# Patient Record
Sex: FEMALE | Race: WHITE | NOT HISPANIC OR LATINO | URBAN - METROPOLITAN AREA
[De-identification: names, ages, dates, MRNs, and addresses within clinical notes are randomized per-mention and may not be internally consistent; named-entity substitution may affect disease eponyms.]

---

## 2018-04-09 ENCOUNTER — EMERGENCY (EMERGENCY)
Facility: HOSPITAL | Age: 63
LOS: 0 days | Discharge: HOME | End: 2018-04-10
Attending: EMERGENCY MEDICINE

## 2018-04-09 VITALS
TEMPERATURE: 98 F | HEART RATE: 70 BPM | DIASTOLIC BLOOD PRESSURE: 91 MMHG | SYSTOLIC BLOOD PRESSURE: 152 MMHG | WEIGHT: 149.91 LBS | HEIGHT: 66 IN | OXYGEN SATURATION: 100 % | RESPIRATION RATE: 18 BRPM

## 2018-04-09 DIAGNOSIS — R10.32 LEFT LOWER QUADRANT PAIN: ICD-10-CM

## 2018-04-09 DIAGNOSIS — R11.2 NAUSEA WITH VOMITING, UNSPECIFIED: ICD-10-CM

## 2018-04-09 DIAGNOSIS — R10.11 RIGHT UPPER QUADRANT PAIN: ICD-10-CM

## 2018-04-09 DIAGNOSIS — Z88.5 ALLERGY STATUS TO NARCOTIC AGENT: ICD-10-CM

## 2018-04-09 DIAGNOSIS — R50.9 FEVER, UNSPECIFIED: ICD-10-CM

## 2018-04-09 DIAGNOSIS — R51 HEADACHE: ICD-10-CM

## 2018-04-09 DIAGNOSIS — R53.1 WEAKNESS: ICD-10-CM

## 2018-04-09 DIAGNOSIS — R19.7 DIARRHEA, UNSPECIFIED: ICD-10-CM

## 2018-04-09 LAB
BASE EXCESS BLDV CALC-SCNC: 3.7 MMOL/L — HIGH (ref -2–2)
CA-I SERPL-SCNC: 1.25 MMOL/L — SIGNIFICANT CHANGE UP (ref 1.12–1.3)
GAS PNL BLDV: 142 MMOL/L — SIGNIFICANT CHANGE UP (ref 136–145)
GAS PNL BLDV: SIGNIFICANT CHANGE UP
HCO3 BLDV-SCNC: 29 MMOL/L — SIGNIFICANT CHANGE UP (ref 22–29)
HCT VFR BLDA CALC: 49 % — HIGH (ref 34–44)
HGB BLD CALC-MCNC: 16 G/DL — SIGNIFICANT CHANGE UP (ref 14–18)
HOROWITZ INDEX BLDV+IHG-RTO: 21 — SIGNIFICANT CHANGE UP
LACTATE BLDV-MCNC: 2 MMOL/L — HIGH (ref 0.5–1.6)
PCO2 BLDV: 43 MMHG — SIGNIFICANT CHANGE UP (ref 41–51)
PH BLDV: 7.43 — SIGNIFICANT CHANGE UP (ref 7.26–7.43)
PO2 BLDV: 20 MMHG — SIGNIFICANT CHANGE UP (ref 20–40)
POTASSIUM BLDV-SCNC: 3.7 MMOL/L — SIGNIFICANT CHANGE UP (ref 3.3–5.6)
SAO2 % BLDV: 37 % — SIGNIFICANT CHANGE UP

## 2018-04-09 RX ORDER — SODIUM CHLORIDE 9 MG/ML
1000 INJECTION INTRAMUSCULAR; INTRAVENOUS; SUBCUTANEOUS ONCE
Qty: 0 | Refills: 0 | Status: COMPLETED | OUTPATIENT
Start: 2018-04-09 | End: 2018-04-09

## 2018-04-09 RX ORDER — DIPHENHYDRAMINE HCL 50 MG
50 CAPSULE ORAL ONCE
Qty: 0 | Refills: 0 | Status: COMPLETED | OUTPATIENT
Start: 2018-04-09 | End: 2018-04-09

## 2018-04-09 RX ORDER — FAMOTIDINE 10 MG/ML
20 INJECTION INTRAVENOUS ONCE
Qty: 0 | Refills: 0 | Status: COMPLETED | OUTPATIENT
Start: 2018-04-09 | End: 2018-04-09

## 2018-04-09 RX ORDER — METOCLOPRAMIDE HCL 10 MG
10 TABLET ORAL ONCE
Qty: 0 | Refills: 0 | Status: COMPLETED | OUTPATIENT
Start: 2018-04-09 | End: 2018-04-10

## 2018-04-09 RX ORDER — SODIUM CHLORIDE 9 MG/ML
3 INJECTION INTRAMUSCULAR; INTRAVENOUS; SUBCUTANEOUS ONCE
Qty: 0 | Refills: 0 | Status: COMPLETED | OUTPATIENT
Start: 2018-04-09 | End: 2018-04-09

## 2018-04-09 RX ORDER — SODIUM CHLORIDE 9 MG/ML
1000 INJECTION INTRAMUSCULAR; INTRAVENOUS; SUBCUTANEOUS
Qty: 0 | Refills: 0 | Status: DISCONTINUED | OUTPATIENT
Start: 2018-04-09 | End: 2018-04-10

## 2018-04-09 NOTE — ED ADULT TRIAGE NOTE - CHIEF COMPLAINT QUOTE
Patient complaining of nausea, dizziness and multiple episodes of vomiting and diarrhea. Patient also complaining of headache.

## 2018-04-10 VITALS
DIASTOLIC BLOOD PRESSURE: 90 MMHG | OXYGEN SATURATION: 95 % | TEMPERATURE: 98 F | HEART RATE: 68 BPM | SYSTOLIC BLOOD PRESSURE: 130 MMHG | RESPIRATION RATE: 18 BRPM

## 2018-04-10 LAB
ALBUMIN SERPL ELPH-MCNC: 4 G/DL — SIGNIFICANT CHANGE UP (ref 3.5–5.2)
ALP SERPL-CCNC: 80 U/L — SIGNIFICANT CHANGE UP (ref 30–115)
ALT FLD-CCNC: 10 U/L — SIGNIFICANT CHANGE UP (ref 0–41)
ANION GAP SERPL CALC-SCNC: 14 MMOL/L — SIGNIFICANT CHANGE UP (ref 7–14)
AST SERPL-CCNC: 14 U/L — SIGNIFICANT CHANGE UP (ref 0–41)
BASOPHILS # BLD AUTO: 0.05 K/UL — SIGNIFICANT CHANGE UP (ref 0–0.2)
BASOPHILS NFR BLD AUTO: 0.3 % — SIGNIFICANT CHANGE UP (ref 0–1)
BILIRUB DIRECT SERPL-MCNC: <0.2 MG/DL — SIGNIFICANT CHANGE UP (ref 0–0.2)
BILIRUB INDIRECT FLD-MCNC: >0.1 MG/DL — LOW (ref 0.2–1.2)
BILIRUB SERPL-MCNC: 0.3 MG/DL — SIGNIFICANT CHANGE UP (ref 0.2–1.2)
BUN SERPL-MCNC: 10 MG/DL — SIGNIFICANT CHANGE UP (ref 10–20)
CALCIUM SERPL-MCNC: 10.3 MG/DL — HIGH (ref 8.5–10.1)
CHLORIDE SERPL-SCNC: 100 MMOL/L — SIGNIFICANT CHANGE UP (ref 98–110)
CO2 SERPL-SCNC: 27 MMOL/L — SIGNIFICANT CHANGE UP (ref 17–32)
CREAT SERPL-MCNC: 0.8 MG/DL — SIGNIFICANT CHANGE UP (ref 0.7–1.5)
EOSINOPHIL # BLD AUTO: 0.03 K/UL — SIGNIFICANT CHANGE UP (ref 0–0.7)
EOSINOPHIL NFR BLD AUTO: 0.2 % — SIGNIFICANT CHANGE UP (ref 0–8)
GLUCOSE SERPL-MCNC: 146 MG/DL — HIGH (ref 70–99)
HCT VFR BLD CALC: 45.1 % — SIGNIFICANT CHANGE UP (ref 37–47)
HGB BLD-MCNC: 15.1 G/DL — SIGNIFICANT CHANGE UP (ref 12–16)
IMM GRANULOCYTES NFR BLD AUTO: 0.5 % — HIGH (ref 0.1–0.3)
LACTATE SERPL-SCNC: 2.1 MMOL/L — SIGNIFICANT CHANGE UP (ref 0.5–2.2)
LIDOCAIN IGE QN: 42 U/L — SIGNIFICANT CHANGE UP (ref 7–60)
LYMPHOCYTES # BLD AUTO: 1.62 K/UL — SIGNIFICANT CHANGE UP (ref 1.2–3.4)
LYMPHOCYTES # BLD AUTO: 11.1 % — LOW (ref 20.5–51.1)
MCHC RBC-ENTMCNC: 30.3 PG — SIGNIFICANT CHANGE UP (ref 27–31)
MCHC RBC-ENTMCNC: 33.5 G/DL — SIGNIFICANT CHANGE UP (ref 32–37)
MCV RBC AUTO: 90.6 FL — SIGNIFICANT CHANGE UP (ref 81–99)
MONOCYTES # BLD AUTO: 0.48 K/UL — SIGNIFICANT CHANGE UP (ref 0.1–0.6)
MONOCYTES NFR BLD AUTO: 3.3 % — SIGNIFICANT CHANGE UP (ref 1.7–9.3)
NEUTROPHILS # BLD AUTO: 12.33 K/UL — HIGH (ref 1.4–6.5)
NEUTROPHILS NFR BLD AUTO: 84.6 % — HIGH (ref 42.2–75.2)
NRBC # BLD: 0 /100 WBCS — SIGNIFICANT CHANGE UP (ref 0–0)
PLATELET # BLD AUTO: 378 K/UL — SIGNIFICANT CHANGE UP (ref 130–400)
POTASSIUM SERPL-MCNC: 4.2 MMOL/L — SIGNIFICANT CHANGE UP (ref 3.5–5)
POTASSIUM SERPL-SCNC: 4.2 MMOL/L — SIGNIFICANT CHANGE UP (ref 3.5–5)
PROT SERPL-MCNC: 6.5 G/DL — SIGNIFICANT CHANGE UP (ref 6–8)
RBC # BLD: 4.98 M/UL — SIGNIFICANT CHANGE UP (ref 4.2–5.4)
RBC # FLD: 12.9 % — SIGNIFICANT CHANGE UP (ref 11.5–14.5)
SODIUM SERPL-SCNC: 141 MMOL/L — SIGNIFICANT CHANGE UP (ref 135–146)
WBC # BLD: 14.59 K/UL — HIGH (ref 4.8–10.8)
WBC # FLD AUTO: 14.59 K/UL — HIGH (ref 4.8–10.8)

## 2018-04-10 RX ADMIN — Medication 10 MILLIGRAM(S): at 00:26

## 2018-04-10 RX ADMIN — SODIUM CHLORIDE 3 MILLILITER(S): 9 INJECTION INTRAMUSCULAR; INTRAVENOUS; SUBCUTANEOUS at 00:22

## 2018-04-10 RX ADMIN — FAMOTIDINE 20 MILLIGRAM(S): 10 INJECTION INTRAVENOUS at 00:35

## 2018-04-10 RX ADMIN — SODIUM CHLORIDE 1000 MILLILITER(S): 9 INJECTION INTRAMUSCULAR; INTRAVENOUS; SUBCUTANEOUS at 00:28

## 2018-04-10 RX ADMIN — Medication 102 MILLIGRAM(S): at 00:27

## 2018-04-10 RX ADMIN — SODIUM CHLORIDE 125 MILLILITER(S): 9 INJECTION INTRAMUSCULAR; INTRAVENOUS; SUBCUTANEOUS at 02:07

## 2018-04-10 NOTE — ED PROVIDER NOTE - ATTENDING CONTRIBUTION TO CARE
62yF p/w ruq LLq abdominal pain a/w diarrhea. nonbloody  no fever nosyncope  no chest pain or back pain. Pt with hx of migraines seen by neurolgoy but didn't want to take medications recommended, with resurgenceof her typical migraines headaceh pain  described sd throbbing right sided -  o new features PE alert well appearing non toxic cvs rrr resp cta  neck suppl perrl eomi abd soft mild rlw tendneress no rebound guarding regidity nondistended - skin normal - plan labs urine CT reasses

## 2018-04-10 NOTE — ED PROVIDER NOTE - PROGRESS NOTE DETAILS
D/w patient all results and copies given. Pt feeling much better. headache resolved and nausea resolved. Neuro intact. Abd + BS Soft NT/ND

## 2018-04-10 NOTE — ED PROVIDER NOTE - CARE PLAN
Principal Discharge DX:	Nausea vomiting and diarrhea  Secondary Diagnosis:	Abdominal pain  Secondary Diagnosis:	Headache

## 2018-04-10 NOTE — ED PROVIDER NOTE - OBJECTIVE STATEMENT
62 year old female complaining of abd pina with diarrhea fever and weakness. patient states that she also suffers from migraines and at home meds arent working. patient states typical migraine and has had prior neuro consult.

## 2018-10-18 ENCOUNTER — EMERGENCY (EMERGENCY)
Facility: HOSPITAL | Age: 63
LOS: 0 days | Discharge: HOME | End: 2018-10-18
Attending: EMERGENCY MEDICINE | Admitting: EMERGENCY MEDICINE

## 2018-10-18 VITALS
WEIGHT: 149.91 LBS | HEART RATE: 77 BPM | TEMPERATURE: 99 F | DIASTOLIC BLOOD PRESSURE: 102 MMHG | RESPIRATION RATE: 19 BRPM | OXYGEN SATURATION: 97 % | HEIGHT: 66 IN | SYSTOLIC BLOOD PRESSURE: 185 MMHG

## 2018-10-18 VITALS
RESPIRATION RATE: 20 BRPM | HEART RATE: 80 BPM | TEMPERATURE: 98 F | SYSTOLIC BLOOD PRESSURE: 151 MMHG | DIASTOLIC BLOOD PRESSURE: 84 MMHG | OXYGEN SATURATION: 98 %

## 2018-10-18 DIAGNOSIS — R11.0 NAUSEA: ICD-10-CM

## 2018-10-18 DIAGNOSIS — R10.9 UNSPECIFIED ABDOMINAL PAIN: ICD-10-CM

## 2018-10-18 DIAGNOSIS — R10.11 RIGHT UPPER QUADRANT PAIN: ICD-10-CM

## 2018-10-18 DIAGNOSIS — Z88.5 ALLERGY STATUS TO NARCOTIC AGENT: ICD-10-CM

## 2018-10-18 DIAGNOSIS — R10.13 EPIGASTRIC PAIN: ICD-10-CM

## 2018-10-18 DIAGNOSIS — Z87.19 PERSONAL HISTORY OF OTHER DISEASES OF THE DIGESTIVE SYSTEM: ICD-10-CM

## 2018-10-18 DIAGNOSIS — R19.5 OTHER FECAL ABNORMALITIES: ICD-10-CM

## 2018-10-18 DIAGNOSIS — I10 ESSENTIAL (PRIMARY) HYPERTENSION: ICD-10-CM

## 2018-10-18 DIAGNOSIS — Z98.890 OTHER SPECIFIED POSTPROCEDURAL STATES: ICD-10-CM

## 2018-10-18 LAB
ALBUMIN SERPL ELPH-MCNC: 4.6 G/DL — SIGNIFICANT CHANGE UP (ref 3.5–5.2)
ALP SERPL-CCNC: 84 U/L — SIGNIFICANT CHANGE UP (ref 30–115)
ALT FLD-CCNC: 12 U/L — SIGNIFICANT CHANGE UP (ref 0–41)
ANION GAP SERPL CALC-SCNC: 14 MMOL/L — SIGNIFICANT CHANGE UP (ref 7–14)
APPEARANCE UR: CLEAR — SIGNIFICANT CHANGE UP
AST SERPL-CCNC: 18 U/L — SIGNIFICANT CHANGE UP (ref 0–41)
BASOPHILS # BLD AUTO: 0.06 K/UL — SIGNIFICANT CHANGE UP (ref 0–0.2)
BASOPHILS NFR BLD AUTO: 0.7 % — SIGNIFICANT CHANGE UP (ref 0–1)
BILIRUB SERPL-MCNC: 0.2 MG/DL — SIGNIFICANT CHANGE UP (ref 0.2–1.2)
BILIRUB UR-MCNC: NEGATIVE — SIGNIFICANT CHANGE UP
BUN SERPL-MCNC: 10 MG/DL — SIGNIFICANT CHANGE UP (ref 10–20)
CALCIUM SERPL-MCNC: 10 MG/DL — SIGNIFICANT CHANGE UP (ref 8.5–10.1)
CHLORIDE SERPL-SCNC: 101 MMOL/L — SIGNIFICANT CHANGE UP (ref 98–110)
CO2 SERPL-SCNC: 25 MMOL/L — SIGNIFICANT CHANGE UP (ref 17–32)
COLOR SPEC: YELLOW — SIGNIFICANT CHANGE UP
CREAT SERPL-MCNC: 0.9 MG/DL — SIGNIFICANT CHANGE UP (ref 0.7–1.5)
DIFF PNL FLD: NEGATIVE — SIGNIFICANT CHANGE UP
EOSINOPHIL # BLD AUTO: 0.42 K/UL — SIGNIFICANT CHANGE UP (ref 0–0.7)
EOSINOPHIL NFR BLD AUTO: 5.2 % — SIGNIFICANT CHANGE UP (ref 0–8)
GLUCOSE SERPL-MCNC: 92 MG/DL — SIGNIFICANT CHANGE UP (ref 70–99)
GLUCOSE UR QL: NEGATIVE MG/DL — SIGNIFICANT CHANGE UP
HCT VFR BLD CALC: 46.1 % — SIGNIFICANT CHANGE UP (ref 37–47)
HGB BLD-MCNC: 15.4 G/DL — SIGNIFICANT CHANGE UP (ref 12–16)
IMM GRANULOCYTES NFR BLD AUTO: 0.2 % — SIGNIFICANT CHANGE UP (ref 0.1–0.3)
KETONES UR-MCNC: NEGATIVE — SIGNIFICANT CHANGE UP
LACTATE SERPL-SCNC: 0.8 MMOL/L — SIGNIFICANT CHANGE UP (ref 0.5–2.2)
LEUKOCYTE ESTERASE UR-ACNC: NEGATIVE — SIGNIFICANT CHANGE UP
LIDOCAIN IGE QN: 36 U/L — SIGNIFICANT CHANGE UP (ref 7–60)
LYMPHOCYTES # BLD AUTO: 2.31 K/UL — SIGNIFICANT CHANGE UP (ref 1.2–3.4)
LYMPHOCYTES # BLD AUTO: 28.6 % — SIGNIFICANT CHANGE UP (ref 20.5–51.1)
MCHC RBC-ENTMCNC: 30.7 PG — SIGNIFICANT CHANGE UP (ref 27–31)
MCHC RBC-ENTMCNC: 33.4 G/DL — SIGNIFICANT CHANGE UP (ref 32–37)
MCV RBC AUTO: 91.8 FL — SIGNIFICANT CHANGE UP (ref 81–99)
MONOCYTES # BLD AUTO: 0.53 K/UL — SIGNIFICANT CHANGE UP (ref 0.1–0.6)
MONOCYTES NFR BLD AUTO: 6.6 % — SIGNIFICANT CHANGE UP (ref 1.7–9.3)
NEUTROPHILS # BLD AUTO: 4.75 K/UL — SIGNIFICANT CHANGE UP (ref 1.4–6.5)
NEUTROPHILS NFR BLD AUTO: 58.7 % — SIGNIFICANT CHANGE UP (ref 42.2–75.2)
NITRITE UR-MCNC: NEGATIVE — SIGNIFICANT CHANGE UP
NRBC # BLD: 0 /100 WBCS — SIGNIFICANT CHANGE UP (ref 0–0)
PH UR: 5.5 — SIGNIFICANT CHANGE UP (ref 5–8)
PLATELET # BLD AUTO: 332 K/UL — SIGNIFICANT CHANGE UP (ref 130–400)
POTASSIUM SERPL-MCNC: 4.4 MMOL/L — SIGNIFICANT CHANGE UP (ref 3.5–5)
POTASSIUM SERPL-SCNC: 4.4 MMOL/L — SIGNIFICANT CHANGE UP (ref 3.5–5)
PROT SERPL-MCNC: 7.5 G/DL — SIGNIFICANT CHANGE UP (ref 6–8)
PROT UR-MCNC: NEGATIVE MG/DL — SIGNIFICANT CHANGE UP
RBC # BLD: 5.02 M/UL — SIGNIFICANT CHANGE UP (ref 4.2–5.4)
RBC # FLD: 12.4 % — SIGNIFICANT CHANGE UP (ref 11.5–14.5)
SODIUM SERPL-SCNC: 140 MMOL/L — SIGNIFICANT CHANGE UP (ref 135–146)
SP GR SPEC: >=1.03 (ref 1.01–1.03)
TROPONIN T SERPL-MCNC: <0.01 NG/ML — SIGNIFICANT CHANGE UP
UROBILINOGEN FLD QL: 0.2 MG/DL — SIGNIFICANT CHANGE UP (ref 0.2–0.2)
WBC # BLD: 8.09 K/UL — SIGNIFICANT CHANGE UP (ref 4.8–10.8)
WBC # FLD AUTO: 8.09 K/UL — SIGNIFICANT CHANGE UP (ref 4.8–10.8)

## 2018-10-18 RX ORDER — SODIUM CHLORIDE 9 MG/ML
1000 INJECTION INTRAMUSCULAR; INTRAVENOUS; SUBCUTANEOUS ONCE
Qty: 0 | Refills: 0 | Status: COMPLETED | OUTPATIENT
Start: 2018-10-18 | End: 2018-10-18

## 2018-10-18 RX ORDER — FAMOTIDINE 10 MG/ML
20 INJECTION INTRAVENOUS ONCE
Qty: 0 | Refills: 0 | Status: COMPLETED | OUTPATIENT
Start: 2018-10-18 | End: 2018-10-18

## 2018-10-18 RX ORDER — ONDANSETRON 8 MG/1
4 TABLET, FILM COATED ORAL ONCE
Qty: 0 | Refills: 0 | Status: COMPLETED | OUTPATIENT
Start: 2018-10-18 | End: 2018-10-18

## 2018-10-18 RX ADMIN — SODIUM CHLORIDE 2000 MILLILITER(S): 9 INJECTION INTRAMUSCULAR; INTRAVENOUS; SUBCUTANEOUS at 18:30

## 2018-10-18 RX ADMIN — ONDANSETRON 4 MILLIGRAM(S): 8 TABLET, FILM COATED ORAL at 18:59

## 2018-10-18 RX ADMIN — FAMOTIDINE 100 MILLIGRAM(S): 10 INJECTION INTRAVENOUS at 19:01

## 2018-10-18 NOTE — ED ADULT NURSE NOTE - CHIEF COMPLAINT QUOTE
patient complaining of nausea, diarrhea, "stomach swelling and bloated" and abdominal pain RUQ "not bad at all, just there" described as aching, 2/10 patient denies chest pain, pt states, "No chest pain, it's all stomach"

## 2018-10-18 NOTE — ED ADULT TRIAGE NOTE - CHIEF COMPLAINT QUOTE
patient complaining of nausea, diarrhea, "stomach swelling and bloated" and abdominal pain RUQ "not bad at all, just there" described as aching, 2/10 patient complaining of nausea, diarrhea, "stomach swelling and bloated" and abdominal pain RUQ "not bad at all, just there" described as aching, 2/10, pt denies chest pain, pt states, "No chest pain, it's all stomach"

## 2018-10-18 NOTE — ED PROVIDER NOTE - OBJECTIVE STATEMENT
64 yo female with h/o diverticulitis and HTN presents to the ED c/o nausea since this morning. Associated with RUQ abdominal pain. Patient states she gets intermittent RUQ pain x several months. Abdominal pain described as burning, nonradiating. Associated with loose stools. Denies fever, chills, chest pain, sob, vomiting, urinary sxs, flank pain. no h/o abdominal surgery.  Last colonoscopy was 3 years ago.

## 2018-10-18 NOTE — ED PROVIDER NOTE - PHYSICAL EXAMINATION
GENERAL:  well appearing, non-toxic female in no acute distress  SKIN: skin warm, pink and dry. MMM. no rash or skin changes.   PULM: CTAB. Normal respiratory effort. No respiratory distress. No wheezes, stridor, rales or rhonchi. No retractions  CV: RRR, no M/R/G.   ABD: Soft. + mild RUQ abdominal tenderness. neg german sign. no rebound or guarding. no CVA tenderness.   MSK: FROM of all extremities.  No MSK tenderness. No edema, erythema, cyanosis. Distal pulses intact.  NEURO: A+Ox3, no sensory/motor deficits, CN II-XII intact. No speech slurring, pronator drift, facial asymmetry. Normal finger-to-nose  b/l. 5/5 strength throughout. Normal gait. Negative Romberg.

## 2018-10-18 NOTE — ED ADULT NURSE NOTE - NSIMPLEMENTINTERV_GEN_ALL_ED
Implemented All Universal Safety Interventions:  [place] to call system. Call bell, personal items and telephone within reach. Instruct patient to call for assistance. Room bathroom lighting operational. Non-slip footwear when patient is off stretcher. Physically safe environment: no spills, clutter or unnecessary equipment. Stretcher in lowest position, wheels locked, appropriate side rails in place.

## 2018-10-18 NOTE — ED PROVIDER NOTE - NS ED ROS FT
Constitutional: no fever, chills   Cardiovascular: no chest pain, no sob  Respiratory: no cough, no shortness of breath  Gastrointestinal: +nausea. + abdominal pain.  no vomiting. + loose stool. no melena or BRBPR. no prior abdominal surgeries.   : no pelvic pain, no vaginal bleeding or discharge. no urinary sxs, no flank pain.  Integumentary: no rash or skin changes. no edema  Neurological: no headache, no dizziness, no visual changes, no UE/LE weakness or paresthesias.

## 2018-10-18 NOTE — ED PROVIDER NOTE - ATTENDING CONTRIBUTION TO CARE
63 year old female, pmhx of chronic RUQ pain, comes in with nausea and epigastric discomfort, patient states it began yesterday and worsened today, no cp/sob, no vomiting, patient states that she has nausea and a few episodes of loose stool.     CONSTITUTIONAL: Well-developed; well-nourished; in no acute distress. Sitting up and providing appropriate history and physical examination  SKIN: skin exam is warm and dry, no acute rash.  HEAD: Normocephalic; atraumatic.  EYES: PERRL, 3 mm bilateral, no nystagmus, EOM intact; conjunctiva and sclera clear.  ENT: No nasal discharge; airway clear.  NECK: Supple; non tender. + full passive ROM in all directions. No JVD  CARD: S1, S2 normal; no murmurs, gallops, or rubs. Regular rate and rhythm. + Symmetric Strong Pulses  RESP: No wheezes, rales or rhonchi. Good air movement bilaterally  ABD: soft; non-distended; non-tender. No Rebound, No Guarding, No signs of peritonitis, No CVA tenderness. No pulsatile abdominal mass. + Strong and Symmetric Pulses  EXT: Normal ROM. No clubbing, cyanosis or edema. Dp and Pt Pulses intact. Cap refill less than 3 seconds  NEURO: CN 2-12 intact, normal finger to nose, normal romberg, stable gait, no sensory or motor deficits, Alert, oriented, grossly unremarkable. No Focal deficits. GCS 15. NIH 0  PSYCH: Cooperative, appropriate.     Patient states symptoms improved after hydration, repeat abdominal exam prior to discharge unremarkable. Patient given detailed return precautions.

## 2018-10-19 PROBLEM — G43.909 MIGRAINE, UNSPECIFIED, NOT INTRACTABLE, WITHOUT STATUS MIGRAINOSUS: Chronic | Status: ACTIVE | Noted: 2018-04-11

## 2019-10-01 ENCOUNTER — OUTPATIENT (OUTPATIENT)
Dept: OUTPATIENT SERVICES | Facility: HOSPITAL | Age: 64
LOS: 1 days | End: 2019-10-01
Payer: MEDICAID

## 2019-10-01 PROCEDURE — G9001: CPT

## 2019-10-29 ENCOUNTER — EMERGENCY (EMERGENCY)
Facility: HOSPITAL | Age: 64
LOS: 0 days | Discharge: HOME | End: 2019-10-29
Attending: EMERGENCY MEDICINE | Admitting: EMERGENCY MEDICINE
Payer: MEDICAID

## 2019-10-29 VITALS
OXYGEN SATURATION: 98 % | SYSTOLIC BLOOD PRESSURE: 157 MMHG | TEMPERATURE: 98 F | HEART RATE: 68 BPM | RESPIRATION RATE: 18 BRPM | DIASTOLIC BLOOD PRESSURE: 104 MMHG

## 2019-10-29 VITALS — WEIGHT: 149.91 LBS

## 2019-10-29 DIAGNOSIS — Z88.5 ALLERGY STATUS TO NARCOTIC AGENT: ICD-10-CM

## 2019-10-29 DIAGNOSIS — R10.9 UNSPECIFIED ABDOMINAL PAIN: ICD-10-CM

## 2019-10-29 DIAGNOSIS — R10.30 LOWER ABDOMINAL PAIN, UNSPECIFIED: ICD-10-CM

## 2019-10-29 DIAGNOSIS — R19.7 DIARRHEA, UNSPECIFIED: ICD-10-CM

## 2019-10-29 DIAGNOSIS — R11.0 NAUSEA: ICD-10-CM

## 2019-10-29 LAB
ALBUMIN SERPL ELPH-MCNC: 3.9 G/DL — SIGNIFICANT CHANGE UP (ref 3.5–5.2)
ALP SERPL-CCNC: 78 U/L — SIGNIFICANT CHANGE UP (ref 30–115)
ALT FLD-CCNC: 12 U/L — SIGNIFICANT CHANGE UP (ref 0–41)
ANION GAP SERPL CALC-SCNC: 10 MMOL/L — SIGNIFICANT CHANGE UP (ref 7–14)
AST SERPL-CCNC: 12 U/L — SIGNIFICANT CHANGE UP (ref 0–41)
BASOPHILS # BLD AUTO: 0.04 K/UL — SIGNIFICANT CHANGE UP (ref 0–0.2)
BASOPHILS NFR BLD AUTO: 0.6 % — SIGNIFICANT CHANGE UP (ref 0–1)
BILIRUB DIRECT SERPL-MCNC: <0.2 MG/DL — SIGNIFICANT CHANGE UP (ref 0–0.2)
BILIRUB INDIRECT FLD-MCNC: >0.1 MG/DL — LOW (ref 0.2–1.2)
BILIRUB SERPL-MCNC: 0.3 MG/DL — SIGNIFICANT CHANGE UP (ref 0.2–1.2)
BUN SERPL-MCNC: 11 MG/DL — SIGNIFICANT CHANGE UP (ref 10–20)
CALCIUM SERPL-MCNC: 9.4 MG/DL — SIGNIFICANT CHANGE UP (ref 8.5–10.1)
CHLORIDE SERPL-SCNC: 104 MMOL/L — SIGNIFICANT CHANGE UP (ref 98–110)
CO2 SERPL-SCNC: 26 MMOL/L — SIGNIFICANT CHANGE UP (ref 17–32)
CREAT SERPL-MCNC: 0.8 MG/DL — SIGNIFICANT CHANGE UP (ref 0.7–1.5)
EOSINOPHIL # BLD AUTO: 0.08 K/UL — SIGNIFICANT CHANGE UP (ref 0–0.7)
EOSINOPHIL NFR BLD AUTO: 1.3 % — SIGNIFICANT CHANGE UP (ref 0–8)
GLUCOSE SERPL-MCNC: 96 MG/DL — SIGNIFICANT CHANGE UP (ref 70–99)
HCT VFR BLD CALC: 47.5 % — HIGH (ref 37–47)
HGB BLD-MCNC: 15.6 G/DL — SIGNIFICANT CHANGE UP (ref 12–16)
IMM GRANULOCYTES NFR BLD AUTO: 0.3 % — SIGNIFICANT CHANGE UP (ref 0.1–0.3)
LACTATE SERPL-SCNC: 0.9 MMOL/L — SIGNIFICANT CHANGE UP (ref 0.5–2.2)
LIDOCAIN IGE QN: 38 U/L — SIGNIFICANT CHANGE UP (ref 7–60)
LYMPHOCYTES # BLD AUTO: 1.14 K/UL — LOW (ref 1.2–3.4)
LYMPHOCYTES # BLD AUTO: 18 % — LOW (ref 20.5–51.1)
MAGNESIUM SERPL-MCNC: 2.1 MG/DL — SIGNIFICANT CHANGE UP (ref 1.8–2.4)
MCHC RBC-ENTMCNC: 30.5 PG — SIGNIFICANT CHANGE UP (ref 27–31)
MCHC RBC-ENTMCNC: 32.8 G/DL — SIGNIFICANT CHANGE UP (ref 32–37)
MCV RBC AUTO: 92.8 FL — SIGNIFICANT CHANGE UP (ref 81–99)
MONOCYTES # BLD AUTO: 0.37 K/UL — SIGNIFICANT CHANGE UP (ref 0.1–0.6)
MONOCYTES NFR BLD AUTO: 5.9 % — SIGNIFICANT CHANGE UP (ref 1.7–9.3)
NEUTROPHILS # BLD AUTO: 4.67 K/UL — SIGNIFICANT CHANGE UP (ref 1.4–6.5)
NEUTROPHILS NFR BLD AUTO: 73.9 % — SIGNIFICANT CHANGE UP (ref 42.2–75.2)
NRBC # BLD: 0 /100 WBCS — SIGNIFICANT CHANGE UP (ref 0–0)
PLATELET # BLD AUTO: 286 K/UL — SIGNIFICANT CHANGE UP (ref 130–400)
POTASSIUM SERPL-MCNC: 4 MMOL/L — SIGNIFICANT CHANGE UP (ref 3.5–5)
POTASSIUM SERPL-SCNC: 4 MMOL/L — SIGNIFICANT CHANGE UP (ref 3.5–5)
PROT SERPL-MCNC: 6.3 G/DL — SIGNIFICANT CHANGE UP (ref 6–8)
RBC # BLD: 5.12 M/UL — SIGNIFICANT CHANGE UP (ref 4.2–5.4)
RBC # FLD: 12.6 % — SIGNIFICANT CHANGE UP (ref 11.5–14.5)
SODIUM SERPL-SCNC: 140 MMOL/L — SIGNIFICANT CHANGE UP (ref 135–146)
WBC # BLD: 6.32 K/UL — SIGNIFICANT CHANGE UP (ref 4.8–10.8)
WBC # FLD AUTO: 6.32 K/UL — SIGNIFICANT CHANGE UP (ref 4.8–10.8)

## 2019-10-29 PROCEDURE — 99284 EMERGENCY DEPT VISIT MOD MDM: CPT

## 2019-10-29 RX ORDER — SODIUM CHLORIDE 9 MG/ML
1000 INJECTION, SOLUTION INTRAVENOUS ONCE
Refills: 0 | Status: COMPLETED | OUTPATIENT
Start: 2019-10-29 | End: 2019-10-29

## 2019-10-29 RX ORDER — DIPHENHYDRAMINE HYDROCHLORIDE AND LIDOCAINE HYDROCHLORIDE AND ALUMINUM HYDROXIDE AND MAGNESIUM HYDRO
30 KIT ONCE
Refills: 0 | Status: COMPLETED | OUTPATIENT
Start: 2019-10-29 | End: 2019-10-29

## 2019-10-29 RX ORDER — FAMOTIDINE 10 MG/ML
20 INJECTION INTRAVENOUS ONCE
Refills: 0 | Status: COMPLETED | OUTPATIENT
Start: 2019-10-29 | End: 2019-10-29

## 2019-10-29 RX ADMIN — SODIUM CHLORIDE 1000 MILLILITER(S): 9 INJECTION, SOLUTION INTRAVENOUS at 12:07

## 2019-10-29 RX ADMIN — SODIUM CHLORIDE 1000 MILLILITER(S): 9 INJECTION, SOLUTION INTRAVENOUS at 10:18

## 2019-10-29 RX ADMIN — DIPHENHYDRAMINE HYDROCHLORIDE AND LIDOCAINE HYDROCHLORIDE AND ALUMINUM HYDROXIDE AND MAGNESIUM HYDRO 30 MILLILITER(S): KIT at 13:55

## 2019-10-29 RX ADMIN — Medication 20 MILLIGRAM(S): at 10:18

## 2019-10-29 RX ADMIN — FAMOTIDINE 20 MILLIGRAM(S): 10 INJECTION INTRAVENOUS at 10:18

## 2019-10-29 NOTE — ED PROVIDER NOTE - PATIENT PORTAL LINK FT
You can access the FollowMyHealth Patient Portal offered by [facility] by registering at the following website: [email or web address]. By joining [facility]’s FollowMyHealth portal, you will also be able to view your health information using other applications (apps) compatible with our system.

## 2019-10-29 NOTE — ED PROVIDER NOTE - CARE PROVIDER_API CALL
Carli Churchill)  Internal Medicine  [address] [place] [address]  Phone: [phone]  Fax: [phone]  Follow Up Time: 1-3 Days    Gerry Rodriguez)  Gastroenterology  [address] [place] [address]  Phone: [phone]  Fax: [phone]  Follow Up Time: 1-3 Days

## 2019-10-29 NOTE — ED PROVIDER NOTE - CLINICAL SUMMARY MEDICAL DECISION MAKING FREE TEXT BOX
Pt with h/o "stomach issues" s/p egd/colonoscopy in past, also with left inguinal hernia, c/o 2d copious watery nb diarrhea with mild crampy lower abd pain, +nausea no vomiting, no recent travel, sick contacts, or abx use, though it was from bad food, on exam vital signs appreciated (took BP meds immediately pta), well appearing, head nc/at, perrla, EOMI, conj pink op clear neck supple cor rrr lungs cta abd +bs, snt/nd no hernias on valsalva, no c/c/e pulses equal calves nontender neuro intact, labs reviewed and d.w patient who remains well appearing with snt abdomen, will d/c to f/u with GI. Patient counseled regarding conditions which should prompt return.

## 2019-10-29 NOTE — ED PROVIDER NOTE - NS ED ROS FT
Constitutional: (-) fever, (-) chills  Eyes: (-) visual changes  ENT: (-) nasal congestions  Cardiovascular: (-) chest pain, (-) syncope  Respiratory: (-) cough, (-) shortness of breath, (-) dyspnea,   Gastrointestinal: (-) vomiting, (+) diarrhea, (-)nausea,  Musculoskeletal: (-) neck pain, (-) back pain, (-) joint pain,  Integumentary: (-) rash, (-) edema, (-) bruises  Neurological: (-) headache, (-) loc, (-) dizziness, (-) tingling, (-)numbness,  Peripheral Vascular: (-) leg swelling  :  (-)dysuria,  (-) hematuria  Allergic/Immunologic: (-) pruritus

## 2019-10-29 NOTE — ED PROVIDER NOTE - PHYSICAL EXAMINATION
Physical Exam    Vital Signs: I have reviewed the initial vital signs.  Constitutional: well-nourished, appears stated age, no acute distress  Eyes: Conjunctiva pink, Sclera clear.  Cardiovascular: S1 and S2, regular rate, regular rhythm, well-perfused extremities, radial pulses equal and 2+  Respiratory: unlabored respiratory effort, clear to auscultation bilaterally no wheezing, rales and rhonchi  Gastrointestinal: soft, non-tender abdomen, no pulsatile mass, normal bowl sounds  Musculoskeletal: supple neck, no lower extremity edema, no midline tenderness  Integumentary: warm, dry, no rash  Neurologic: awake, alert, nvi, extremities’ motor and sensory functions grossly intact

## 2019-10-29 NOTE — ED PROVIDER NOTE - OBJECTIVE STATEMENT
63 yo female, pmh of htn, presents to ed for diarrhea, 2 days, mild, nb, loose stools, no radiaiton of pain, has had before, recent endoscopy/colonoscopy negative. Denies fever, chills, cp, sob, le swelling, nv, back pain.

## 2019-10-29 NOTE — ED PROVIDER NOTE - PROVIDER TOKENS
PROVIDER:[TOKEN:[[id number]],FOLLOWUP:[1-3 Days]],PROVIDER:[TOKEN:[[id number]],FOLLOWUP:[1-3 Days]]

## 2019-10-29 NOTE — ED PROVIDER NOTE - CARE PROVIDERS DIRECT ADDRESSES
[email or web address]

## 2019-10-30 DIAGNOSIS — Z71.89 OTHER SPECIFIED COUNSELING: ICD-10-CM

## 2020-11-24 NOTE — ED ADULT TRIAGE NOTE - ACCOMPANIED BY
Arleen Zarate 89 y.o. female is here today for Blood Pressure check.       Review of patient's allergies indicates:   Allergen Reactions    Doxy      Other reaction(s): Unknown    Effexor [venlafaxine] Other (See Comments)     shaking    Biaxin [clarithromycin] Other (See Comments)     Unknown    Codeine Other (See Comments)     Syncope, and nausea.    Statins-hmg-coa reductase inhibitors      myalgia    Doxycycline Other (See Comments)     Made very weak     Creatinine   Date Value Ref Range Status   09/29/2020 1.0 0.5 - 1.4 mg/dL Final     Sodium   Date Value Ref Range Status   09/29/2020 143 136 - 145 mmol/L Final     Potassium   Date Value Ref Range Status   09/29/2020 4.5 3.5 - 5.1 mmol/L Final   ]  Patient verifies taking blood pressure medications on a regular basis at the same time of the day.     Current Outpatient Medications:     lisinopriL (PRINIVIL,ZESTRIL) 30 MG tablet, Take 1 tablet (30 mg total) by mouth once daily., Disp: 90 tablet, Rfl: 0    nisoldipine (SULAR) 8.5 MG Tb24, Take 1 tablet (8.5 mg total) by mouth once daily., Disp: 90 tablet, Rfl: 3    buPROPion (WELLBUTRIN) 75 MG tablet, Take 1 tablet (75 mg total) by mouth once daily., Disp: 30 tablet, Rfl: 1    clotrimazole-betamethasone 1-0.05% (LOTRISONE) cream, APPLY TOPICALLY TWICE A DAY, Disp: 45 g, Rfl: 14    fluticasone propionate (FLONASE) 50 mcg/actuation nasal spray, 1 spray (50 mcg total) by Each Nostril route once daily. (Patient not taking: Reported on 9/29/2020), Disp: 1 Bottle, Rfl: 3    methylsulfonylmethane (MSM) 1,000 mg Cap, Take 1 capsule by mouth 2 (two) times daily. , Disp: , Rfl:     multivitamin capsule, Take 1 capsule by mouth once daily., Disp: , Rfl:     omeprazole (PRILOSEC) 40 MG capsule, Take 1 capsule (40 mg total) by mouth once daily., Disp: 90 capsule, Rfl: 1      BP: 132/70 ,   .           Immediate family member

## 2021-12-07 ENCOUNTER — EMERGENCY (EMERGENCY)
Facility: HOSPITAL | Age: 66
LOS: 0 days | Discharge: HOME | End: 2021-12-07
Attending: EMERGENCY MEDICINE | Admitting: EMERGENCY MEDICINE
Payer: MEDICAID

## 2021-12-07 VITALS
HEART RATE: 64 BPM | DIASTOLIC BLOOD PRESSURE: 98 MMHG | SYSTOLIC BLOOD PRESSURE: 161 MMHG | RESPIRATION RATE: 18 BRPM | OXYGEN SATURATION: 97 % | TEMPERATURE: 98 F

## 2021-12-07 VITALS
SYSTOLIC BLOOD PRESSURE: 165 MMHG | TEMPERATURE: 98 F | HEART RATE: 69 BPM | HEIGHT: 66 IN | RESPIRATION RATE: 19 BRPM | OXYGEN SATURATION: 97 % | WEIGHT: 138.01 LBS | DIASTOLIC BLOOD PRESSURE: 105 MMHG

## 2021-12-07 DIAGNOSIS — Z87.19 PERSONAL HISTORY OF OTHER DISEASES OF THE DIGESTIVE SYSTEM: ICD-10-CM

## 2021-12-07 DIAGNOSIS — Z88.5 ALLERGY STATUS TO NARCOTIC AGENT: ICD-10-CM

## 2021-12-07 DIAGNOSIS — K50.00 CROHN'S DISEASE OF SMALL INTESTINE WITHOUT COMPLICATIONS: ICD-10-CM

## 2021-12-07 DIAGNOSIS — R10.9 UNSPECIFIED ABDOMINAL PAIN: ICD-10-CM

## 2021-12-07 DIAGNOSIS — G43.909 MIGRAINE, UNSPECIFIED, NOT INTRACTABLE, WITHOUT STATUS MIGRAINOSUS: ICD-10-CM

## 2021-12-07 LAB
ALBUMIN SERPL ELPH-MCNC: 3.4 G/DL — LOW (ref 3.5–5.2)
ALP SERPL-CCNC: 78 U/L — SIGNIFICANT CHANGE UP (ref 30–115)
ALT FLD-CCNC: 8 U/L — SIGNIFICANT CHANGE UP (ref 0–41)
ANION GAP SERPL CALC-SCNC: 11 MMOL/L — SIGNIFICANT CHANGE UP (ref 7–14)
APPEARANCE UR: CLEAR — SIGNIFICANT CHANGE UP
AST SERPL-CCNC: 7 U/L — SIGNIFICANT CHANGE UP (ref 0–41)
BASOPHILS # BLD AUTO: 0.03 K/UL — SIGNIFICANT CHANGE UP (ref 0–0.2)
BASOPHILS NFR BLD AUTO: 0.3 % — SIGNIFICANT CHANGE UP (ref 0–1)
BILIRUB SERPL-MCNC: 0.3 MG/DL — SIGNIFICANT CHANGE UP (ref 0.2–1.2)
BILIRUB UR-MCNC: NEGATIVE — SIGNIFICANT CHANGE UP
BUN SERPL-MCNC: 7 MG/DL — LOW (ref 10–20)
CALCIUM SERPL-MCNC: 8.7 MG/DL — SIGNIFICANT CHANGE UP (ref 8.5–10.1)
CHLORIDE SERPL-SCNC: 103 MMOL/L — SIGNIFICANT CHANGE UP (ref 98–110)
CO2 SERPL-SCNC: 25 MMOL/L — SIGNIFICANT CHANGE UP (ref 17–32)
COLOR SPEC: YELLOW — SIGNIFICANT CHANGE UP
CREAT SERPL-MCNC: 0.8 MG/DL — SIGNIFICANT CHANGE UP (ref 0.7–1.5)
DIFF PNL FLD: NEGATIVE — SIGNIFICANT CHANGE UP
EOSINOPHIL # BLD AUTO: 0.08 K/UL — SIGNIFICANT CHANGE UP (ref 0–0.7)
EOSINOPHIL NFR BLD AUTO: 0.9 % — SIGNIFICANT CHANGE UP (ref 0–8)
GLUCOSE SERPL-MCNC: 96 MG/DL — SIGNIFICANT CHANGE UP (ref 70–99)
GLUCOSE UR QL: NEGATIVE MG/DL — SIGNIFICANT CHANGE UP
HCT VFR BLD CALC: 40.9 % — SIGNIFICANT CHANGE UP (ref 37–47)
HGB BLD-MCNC: 13.3 G/DL — SIGNIFICANT CHANGE UP (ref 12–16)
IMM GRANULOCYTES NFR BLD AUTO: 0.2 % — SIGNIFICANT CHANGE UP (ref 0.1–0.3)
KETONES UR-MCNC: ABNORMAL
LACTATE SERPL-SCNC: 1 MMOL/L — SIGNIFICANT CHANGE UP (ref 0.7–2)
LEUKOCYTE ESTERASE UR-ACNC: NEGATIVE — SIGNIFICANT CHANGE UP
LIDOCAIN IGE QN: 19 U/L — SIGNIFICANT CHANGE UP (ref 7–60)
LYMPHOCYTES # BLD AUTO: 1.31 K/UL — SIGNIFICANT CHANGE UP (ref 1.2–3.4)
LYMPHOCYTES # BLD AUTO: 14.6 % — LOW (ref 20.5–51.1)
MCHC RBC-ENTMCNC: 30 PG — SIGNIFICANT CHANGE UP (ref 27–31)
MCHC RBC-ENTMCNC: 32.5 G/DL — SIGNIFICANT CHANGE UP (ref 32–37)
MCV RBC AUTO: 92.1 FL — SIGNIFICANT CHANGE UP (ref 81–99)
MONOCYTES # BLD AUTO: 0.66 K/UL — HIGH (ref 0.1–0.6)
MONOCYTES NFR BLD AUTO: 7.4 % — SIGNIFICANT CHANGE UP (ref 1.7–9.3)
NEUTROPHILS # BLD AUTO: 6.86 K/UL — HIGH (ref 1.4–6.5)
NEUTROPHILS NFR BLD AUTO: 76.6 % — HIGH (ref 42.2–75.2)
NITRITE UR-MCNC: NEGATIVE — SIGNIFICANT CHANGE UP
NRBC # BLD: 0 /100 WBCS — SIGNIFICANT CHANGE UP (ref 0–0)
PH UR: 7 — SIGNIFICANT CHANGE UP (ref 5–8)
PLATELET # BLD AUTO: 318 K/UL — SIGNIFICANT CHANGE UP (ref 130–400)
POTASSIUM SERPL-MCNC: 3.6 MMOL/L — SIGNIFICANT CHANGE UP (ref 3.5–5)
POTASSIUM SERPL-SCNC: 3.6 MMOL/L — SIGNIFICANT CHANGE UP (ref 3.5–5)
PROT SERPL-MCNC: 5.6 G/DL — LOW (ref 6–8)
PROT UR-MCNC: NEGATIVE MG/DL — SIGNIFICANT CHANGE UP
RBC # BLD: 4.44 M/UL — SIGNIFICANT CHANGE UP (ref 4.2–5.4)
RBC # FLD: 13.1 % — SIGNIFICANT CHANGE UP (ref 11.5–14.5)
SODIUM SERPL-SCNC: 139 MMOL/L — SIGNIFICANT CHANGE UP (ref 135–146)
SP GR SPEC: 1.01 — SIGNIFICANT CHANGE UP (ref 1.01–1.03)
UROBILINOGEN FLD QL: 0.2 MG/DL — SIGNIFICANT CHANGE UP
WBC # BLD: 8.96 K/UL — SIGNIFICANT CHANGE UP (ref 4.8–10.8)
WBC # FLD AUTO: 8.96 K/UL — SIGNIFICANT CHANGE UP (ref 4.8–10.8)

## 2021-12-07 PROCEDURE — 74177 CT ABD & PELVIS W/CONTRAST: CPT | Mod: 26,MA

## 2021-12-07 PROCEDURE — 99285 EMERGENCY DEPT VISIT HI MDM: CPT

## 2021-12-07 RX ORDER — METOCLOPRAMIDE HCL 10 MG
10 TABLET ORAL ONCE
Refills: 0 | Status: COMPLETED | OUTPATIENT
Start: 2021-12-07 | End: 2021-12-07

## 2021-12-07 RX ORDER — KETOROLAC TROMETHAMINE 30 MG/ML
1 SYRINGE (ML) INJECTION
Qty: 15 | Refills: 0
Start: 2021-12-07 | End: 2021-12-11

## 2021-12-07 RX ORDER — KETOROLAC TROMETHAMINE 30 MG/ML
15 SYRINGE (ML) INJECTION ONCE
Refills: 0 | Status: DISCONTINUED | OUTPATIENT
Start: 2021-12-07 | End: 2021-12-07

## 2021-12-07 RX ORDER — METRONIDAZOLE 500 MG
1 TABLET ORAL
Qty: 21 | Refills: 0
Start: 2021-12-07 | End: 2021-12-13

## 2021-12-07 RX ORDER — SODIUM CHLORIDE 9 MG/ML
1000 INJECTION INTRAMUSCULAR; INTRAVENOUS; SUBCUTANEOUS ONCE
Refills: 0 | Status: COMPLETED | OUTPATIENT
Start: 2021-12-07 | End: 2021-12-07

## 2021-12-07 RX ORDER — ONDANSETRON 8 MG/1
1 TABLET, FILM COATED ORAL
Qty: 15 | Refills: 0
Start: 2021-12-07 | End: 2021-12-13

## 2021-12-07 RX ADMIN — SODIUM CHLORIDE 1000 MILLILITER(S): 9 INJECTION INTRAMUSCULAR; INTRAVENOUS; SUBCUTANEOUS at 15:29

## 2021-12-07 RX ADMIN — Medication 10 MILLIGRAM(S): at 15:28

## 2021-12-07 RX ADMIN — Medication 15 MILLIGRAM(S): at 15:28

## 2021-12-07 NOTE — ED PROVIDER NOTE - OBJECTIVE STATEMENT
67 y/o female with hx of diverticulitis presents to the ED with abdominal pain and cramping worsening over two days. patient denies any dysuria or back pain. patient denies any recent antibx , sick contacts, bad food exposure. patient denies any chills. patient with decreased po intake. patient covid vaccinated.

## 2021-12-07 NOTE — ED PROVIDER NOTE - CARE PROVIDER_API CALL
Batsheva Rodriguez  GASTROENTEROLOGY  [address] [place] [address]  Phone: [phone]  Fax: [phone]  Follow Up Time:

## 2021-12-07 NOTE — ED PROVIDER NOTE - NSFOLLOWUPINSTRUCTIONS_ED_ALL_ED_FT
RETURN TO ER FOR WORSENING PAIN, FEVER, VOMITING OR ANY OTHER CONCERN.    Ileitis  What Is Ileitis?  Ileitis is a condition characterized by irritation or inflammation of the ileum, the last part of the small intestine that joins the large intestine. Symptoms include weight loss, diarrhea, cramping or pain in the abdomen, or fistulas (abnormal channels that develop between parts of the intestine). Ileitis can be caused by many conditions, Crohn’s disease being the most common. Other causes include infections, effects of NSAIDs, ischemia and abnormal growths.    Diagnosis of the exact cause of ileitis is critical to timely treatment and the treatment plan decided by your doctor. Your doctor will review your medical history and perform a thorough physical examination. A series of tests may also be ordered to confirm the diagnosis which may include stool analysis, X-rays, barium X-rays of the small intestine, CT scan, colonoscopy (narrow lighted tube is inserted into the anus for a close examination) and biopsy.    Based on the results of the diagnostic tests, ileitis may be treated with medications including antibiotics, corticosteroids, anti-inflammatories, antidiarrheal and immune-suppressing medications, as well as dietary supplements to reduce inflammation and manage associated symptoms. Surgery is indicated if symptoms are not controlled with medications or complications develop. Surgery is performed to remove the diseased part, correct blockages, intestinal bleeding and perforations in the intestine. You need to maintain a healthy lifestyle, exercise regularly, eat well and avoid smoking.

## 2021-12-07 NOTE — ED PROVIDER NOTE - CLINICAL SUMMARY MEDICAL DECISION MAKING FREE TEXT BOX
65 y/o F PMHx Diverticulitis who presents with 2 days of cramping abdominal pain. No fever or urinary symptoms. Patient is well appearing and tolerating PO. VS reviewed, + mild tenderness to lower abdomen at RLQ. IV placed, labs and urine sent, patient had CT scan. ED work up consistent with terminal ileitis. Patient has follow up with GI and does not have a history of Crohn's disease. Will DC on antibiotics and patient aware she may require a repeat colonoscopy.    Patient is a good candidate to attempt outpatient management. Supportive care and home care discussed in detail. Patient aware they may have to return for re-evaluation and possible admission if outpatient treatment fails. Strict return precautions discussed.    Full DC instructions discussed and patient knows when to seek immediate medical attention.  Patient has proper follow up.  All results discussed and patient aware they may require further work up.  Proper follow up ensured. Limitations of ED work up discussed.  Medications administered and prescribed/OTC home meds discussed.  All questions and concerns from patient or family addressed. Understanding of instructions verbalized.

## 2021-12-07 NOTE — ED PROVIDER NOTE - PRO INTERPRETER NEED 2
Bailey Baeza (:  1970) is a 52 y.o. female,Established patient, here for evaluation of the following chief complaint(s):    Willy Marcial was seen today for diabetes. Diagnoses and all orders for this visit:    Type 2 diabetes mellitus without complication, without long-term current use of insulin (HCC)  POCT glycosylated hemoglobin (Hb A1C) 7.5 was 8.6  metFORMIN (GLUCOPHAGE-XR) 500 MG extended release tablet; Take 2 tablets by mouth 2 times daily (with meals)  She is encouraged to continue decreasing carb intake  Follow up in six months      Hereditary angioneurotic edema (HCC)  -     danazol (DANOCRINE) 200 MG capsule; Take 1 capsule by mouth daily    Essential hypertension  Continue monitoring b/p with goal of 130/80 or less due to DM  continue chlorthalidone (HYGROTON) 25 MG tablet; TAKE ONE TABLET BY MOUTH DAILY  PT WILL PICKUP WHEN READY  Reduce sodium intake to less than 1500 mg daily  Include 5-7 servings of fruits and vegetables daily  Reduce weight to ideal if overweight  30 minutes of physical activity daily            SUBJECTIVE/OBJECTIVE:  HPI  ROUTINE FOLLOW UP   DIABETES  SHE IS NOT CHECKING HER BLOOD SUGARS AT HOME  HER MACHINE DOES NOT WORK  SHE HAS CUT BACK ON HER CARB INTAKE  SHE WALKS FIVE NIGHTS WEEKLY  NO C/O INCREASED THIRST FREQUENT URINATION OR BLURRY             Hypertension: Patient here for follow-up of elevated blood pressure. She is exercising and is adherent to low salt diet. Blood pressure is well controlled at home. Cardiac symptoms none. Patient denies none. Cardiovascular risk factors: diabetes mellitus, hypertension and obesity (BMI >= 30 kg/m2). Use of agents associated with hypertension: none. History of target organ damage: none.     HEREDITARY ANGIONEUROTIC EDEMA  SHE TAKES THE DANAZOL DAILY   IF DHE GETS STRESSED OUT - SICK  THEY CAN OCCUR   THEY CAN BE IN HER STOMACH  WHICH CAUSES NAUSEA AND VOMITING   ON HER SKIN ITCHING AND TIGHTNESS FROM THE HIVES  Review of English

## 2021-12-07 NOTE — ED PROVIDER NOTE - ATTENDING CONTRIBUTION TO CARE
I personally evaluated patient. I agree with the findings and plan with all documentation on chart except as documented  in my note.    65 y/o F PMHx Diverticulitis who presents with 2 days of cramping abdominal pain. No fever or urinary symptoms. Patient is well appearing and tolerating PO. VS reviewed, + mild tenderness to lower abdomen at RLQ. IV placed, labs and urine sent, patient had CT scan. ED work up consistent with terminal ileitis. Patient has follow up with GI and does not have a history of Crohn's disease. Will DC on antibiotics and patient aware she may require a repeat colonoscopy.    Patient is a good candidate to attempt outpatient management. Supportive care and home care discussed in detail. Patient aware they may have to return for re-evaluation and possible admission if outpatient treatment fails. Strict return precautions discussed.    Full DC instructions discussed and patient knows when to seek immediate medical attention.  Patient has proper follow up.  All results discussed and patient aware they may require further work up.  Proper follow up ensured. Limitations of ED work up discussed.  Medications administered and prescribed/OTC home meds discussed.  All questions and concerns from patient or family addressed. Understanding of instructions verbalized.

## 2021-12-07 NOTE — ED PROVIDER NOTE - PHYSICAL EXAMINATION
Vital Signs: I have reviewed the initial vital signs.  Constitutional: well-nourished, no acute distress, normocephalic  Eyes: PERRLA, EOMI, , clear conjunctiva  ENT: MMM,  Cardiovascular: regular rate, regular rhythm, no murmur appreciated  Respiratory: unlabored respiratory effort, clear to auscultation bilaterally  Gastrointestinal: soft, +abdominal tenderness right sided, no rebound, no cva tederness   Musculoskeletal: supple neck, no lower extremity edema, no bony tenderness  Integumentary: warm, dry, no rash  Neurologic: awake, alert, cranial nerves II-XII grossly intact, extremities’ motor and sensory functions grossly intact, no focal deficits, GCS 15

## 2021-12-07 NOTE — ED PROVIDER NOTE - NS ED ROS FT
Review of Systems    Constitutional: (-) fever/ chills (+)loss of appetite  Eyes (-) visual changes  ENT: (-) epistaxis (-) sore throat (-) ear pain  Cardiovascular: (-) chest pain, (-) syncope (-) palpitations  Respiratory: (-) cough, (-) shortness of breath  Gastrointestinal: (-) vomiting, (+) diarrhea (+) abdominal pain  : (-) dysuria , hematuria   Musculoskeletal:  (-) back pain, (-) joint pain   Integumentary: (-) rash, (-) swelling  Neurological: (-) headache, (-) altered mental status

## 2021-12-07 NOTE — ED PROVIDER NOTE - PATIENT PORTAL LINK FT
You can access the FollowMyHealth Patient Portal offered by [facility] by registering at the following website: [email or web address]. By joining [facility]’s FollowMyHealth portal, you will also be able to view your health information using other applications (apps) compatible with our system.

## 2021-12-08 LAB
CULTURE RESULTS: SIGNIFICANT CHANGE UP
SPECIMEN SOURCE: SIGNIFICANT CHANGE UP

## 2022-01-08 ENCOUNTER — INPATIENT (INPATIENT)
Facility: HOSPITAL | Age: 67
LOS: 2 days | Discharge: HOME HEALTH | End: 2022-01-11
Payer: MEDICAID

## 2022-01-08 VITALS
SYSTOLIC BLOOD PRESSURE: 160 MMHG | OXYGEN SATURATION: 98 % | TEMPERATURE: 97 F | WEIGHT: 126.99 LBS | HEIGHT: 66 IN | HEART RATE: 76 BPM | DIASTOLIC BLOOD PRESSURE: 103 MMHG

## 2022-01-08 PROBLEM — K57.92 DIVERTICULITIS OF INTESTINE, PART UNSPECIFIED, WITHOUT PERFORATION OR ABSCESS WITHOUT BLEEDING: Chronic | Status: ACTIVE | Noted: 2021-12-07

## 2022-01-08 PROBLEM — I10 ESSENTIAL (PRIMARY) HYPERTENSION: Chronic | Status: ACTIVE | Noted: 2021-12-07

## 2022-01-08 LAB
ALBUMIN SERPL ELPH-MCNC: 3.7 G/DL — SIGNIFICANT CHANGE UP (ref 3.5–5.2)
ALP SERPL-CCNC: 90 U/L — SIGNIFICANT CHANGE UP (ref 30–115)
ALT FLD-CCNC: 6 U/L — SIGNIFICANT CHANGE UP (ref 0–41)
ANION GAP SERPL CALC-SCNC: 14 MMOL/L — SIGNIFICANT CHANGE UP (ref 7–14)
AST SERPL-CCNC: 8 U/L — SIGNIFICANT CHANGE UP (ref 0–41)
BASOPHILS # BLD AUTO: 0.03 K/UL — SIGNIFICANT CHANGE UP (ref 0–0.2)
BASOPHILS NFR BLD AUTO: 0.3 % — SIGNIFICANT CHANGE UP (ref 0–1)
BILIRUB SERPL-MCNC: 0.5 MG/DL — SIGNIFICANT CHANGE UP (ref 0.2–1.2)
BUN SERPL-MCNC: 8 MG/DL — LOW (ref 10–20)
CALCIUM SERPL-MCNC: 9.4 MG/DL — SIGNIFICANT CHANGE UP (ref 8.5–10.1)
CHLORIDE SERPL-SCNC: 99 MMOL/L — SIGNIFICANT CHANGE UP (ref 98–110)
CO2 SERPL-SCNC: 22 MMOL/L — SIGNIFICANT CHANGE UP (ref 17–32)
CREAT SERPL-MCNC: 0.8 MG/DL — SIGNIFICANT CHANGE UP (ref 0.7–1.5)
EOSINOPHIL # BLD AUTO: 0.04 K/UL — SIGNIFICANT CHANGE UP (ref 0–0.7)
EOSINOPHIL NFR BLD AUTO: 0.4 % — SIGNIFICANT CHANGE UP (ref 0–8)
GLUCOSE SERPL-MCNC: 99 MG/DL — SIGNIFICANT CHANGE UP (ref 70–99)
HCT VFR BLD CALC: 43.1 % — SIGNIFICANT CHANGE UP (ref 37–47)
HGB BLD-MCNC: 14 G/DL — SIGNIFICANT CHANGE UP (ref 12–16)
IMM GRANULOCYTES NFR BLD AUTO: 0.4 % — HIGH (ref 0.1–0.3)
LACTATE SERPL-SCNC: 1.3 MMOL/L — SIGNIFICANT CHANGE UP (ref 0.7–2)
LIDOCAIN IGE QN: 19 U/L — SIGNIFICANT CHANGE UP (ref 7–60)
LYMPHOCYTES # BLD AUTO: 1.48 K/UL — SIGNIFICANT CHANGE UP (ref 1.2–3.4)
LYMPHOCYTES # BLD AUTO: 13.5 % — LOW (ref 20.5–51.1)
MCHC RBC-ENTMCNC: 29.3 PG — SIGNIFICANT CHANGE UP (ref 27–31)
MCHC RBC-ENTMCNC: 32.5 G/DL — SIGNIFICANT CHANGE UP (ref 32–37)
MCV RBC AUTO: 90.2 FL — SIGNIFICANT CHANGE UP (ref 81–99)
MONOCYTES # BLD AUTO: 0.69 K/UL — HIGH (ref 0.1–0.6)
MONOCYTES NFR BLD AUTO: 6.3 % — SIGNIFICANT CHANGE UP (ref 1.7–9.3)
NEUTROPHILS # BLD AUTO: 8.68 K/UL — HIGH (ref 1.4–6.5)
NEUTROPHILS NFR BLD AUTO: 79.1 % — HIGH (ref 42.2–75.2)
NRBC # BLD: 0 /100 WBCS — SIGNIFICANT CHANGE UP (ref 0–0)
PLATELET # BLD AUTO: 373 K/UL — SIGNIFICANT CHANGE UP (ref 130–400)
POTASSIUM SERPL-MCNC: 3.5 MMOL/L — SIGNIFICANT CHANGE UP (ref 3.5–5)
POTASSIUM SERPL-SCNC: 3.5 MMOL/L — SIGNIFICANT CHANGE UP (ref 3.5–5)
PROT SERPL-MCNC: 6.2 G/DL — SIGNIFICANT CHANGE UP (ref 6–8)
RBC # BLD: 4.78 M/UL — SIGNIFICANT CHANGE UP (ref 4.2–5.4)
RBC # FLD: 12.4 % — SIGNIFICANT CHANGE UP (ref 11.5–14.5)
SARS-COV-2 RNA SPEC QL NAA+PROBE: SIGNIFICANT CHANGE UP
SODIUM SERPL-SCNC: 135 MMOL/L — SIGNIFICANT CHANGE UP (ref 135–146)
WBC # BLD: 10.96 K/UL — HIGH (ref 4.8–10.8)
WBC # FLD AUTO: 10.96 K/UL — HIGH (ref 4.8–10.8)

## 2022-01-08 PROCEDURE — 99285 EMERGENCY DEPT VISIT HI MDM: CPT

## 2022-01-08 PROCEDURE — 74177 CT ABD & PELVIS W/CONTRAST: CPT | Mod: 26,MA

## 2022-01-08 RX ORDER — METRONIDAZOLE 500 MG
500 TABLET ORAL ONCE
Refills: 0 | Status: COMPLETED | OUTPATIENT
Start: 2022-01-08 | End: 2022-01-08

## 2022-01-08 RX ORDER — HYDROXYZINE HCL 10 MG
25 TABLET ORAL ONCE
Refills: 0 | Status: COMPLETED | OUTPATIENT
Start: 2022-01-08 | End: 2022-01-08

## 2022-01-08 RX ORDER — HEPARIN SODIUM 5000 [USP'U]/ML
5000 INJECTION INTRAVENOUS; SUBCUTANEOUS EVERY 12 HOURS
Refills: 0 | Status: DISCONTINUED | OUTPATIENT
Start: 2022-01-08 | End: 2022-01-11

## 2022-01-08 RX ORDER — ATENOLOL 25 MG/1
25 TABLET ORAL DAILY
Refills: 0 | Status: DISCONTINUED | OUTPATIENT
Start: 2022-01-08 | End: 2022-01-11

## 2022-01-08 RX ORDER — CEFEPIME 1 G/1
2000 INJECTION, POWDER, FOR SOLUTION INTRAMUSCULAR; INTRAVENOUS ONCE
Refills: 0 | Status: COMPLETED | OUTPATIENT
Start: 2022-01-08 | End: 2022-01-08

## 2022-01-08 RX ORDER — MORPHINE SULFATE 50 MG/1
1 CAPSULE, EXTENDED RELEASE ORAL EVERY 6 HOURS
Refills: 0 | Status: DISCONTINUED | OUTPATIENT
Start: 2022-01-08 | End: 2022-01-10

## 2022-01-08 RX ORDER — LISINOPRIL 2.5 MG/1
20 TABLET ORAL DAILY
Refills: 0 | Status: DISCONTINUED | OUTPATIENT
Start: 2022-01-08 | End: 2022-01-09

## 2022-01-08 RX ORDER — CEFEPIME 1 G/1
2000 INJECTION, POWDER, FOR SOLUTION INTRAMUSCULAR; INTRAVENOUS EVERY 12 HOURS
Refills: 0 | Status: DISCONTINUED | OUTPATIENT
Start: 2022-01-08 | End: 2022-01-11

## 2022-01-08 RX ORDER — PANTOPRAZOLE SODIUM 20 MG/1
40 TABLET, DELAYED RELEASE ORAL
Refills: 0 | Status: DISCONTINUED | OUTPATIENT
Start: 2022-01-08 | End: 2022-01-11

## 2022-01-08 RX ORDER — METRONIDAZOLE 500 MG
500 TABLET ORAL EVERY 8 HOURS
Refills: 0 | Status: DISCONTINUED | OUTPATIENT
Start: 2022-01-08 | End: 2022-01-11

## 2022-01-08 RX ORDER — SODIUM CHLORIDE 9 MG/ML
1000 INJECTION, SOLUTION INTRAVENOUS
Refills: 0 | Status: DISCONTINUED | OUTPATIENT
Start: 2022-01-08 | End: 2022-01-11

## 2022-01-08 RX ADMIN — SODIUM CHLORIDE 75 MILLILITER(S): 9 INJECTION, SOLUTION INTRAVENOUS at 23:27

## 2022-01-08 RX ADMIN — Medication 100 MILLIGRAM(S): at 16:26

## 2022-01-08 RX ADMIN — CEFEPIME 100 MILLIGRAM(S): 1 INJECTION, POWDER, FOR SOLUTION INTRAMUSCULAR; INTRAVENOUS at 16:25

## 2022-01-08 RX ADMIN — Medication 25 MILLIGRAM(S): at 23:27

## 2022-01-08 RX ADMIN — SODIUM CHLORIDE 75 MILLILITER(S): 9 INJECTION, SOLUTION INTRAVENOUS at 19:26

## 2022-01-08 NOTE — H&P ADULT - ASSESSMENT
65 yo F PMH diverticulitis, HTN. Presents with abdominal pain x 3 weeks, worsening in the past 2 days. VSS. No sig physical exam findings.      Abd pain- 2/2 ileitis + abscess x 2. Failed Outpatient Abx trial  Trend CBC  Pain Control PRN   GI consult   Consider Sx eval   IV Cefepime + Flagyl   NPO     HTN- controlled  Cont home BP meds   Vitals q6 hours     DVT/ GI PPX   Case Disccused with Dr. Patricia Sears PA

## 2022-01-08 NOTE — ED PROVIDER NOTE - NS ED ROS FT
Review of Systems    Constitutional: (-) fever/ chills (-)loss of appetite or  weight loss  Eyes (-) visual changes  ENT: (-) epistaxis (-) sore throat (-) ear pain  Cardiovascular: (-) chest pain, (-) syncope (-) palpitations  Respiratory: (-) cough, (-) shortness of breath  Gastrointestinal: (-) vomiting, (-) diarrhea (+) abdominal pain  : (-) dysuria , hematuria   Musculoskeletal:  (-) back pain, (-) joint pain   Integumentary: (-) rash, (-) swelling

## 2022-01-08 NOTE — ED PROVIDER NOTE - PHYSICAL EXAMINATION
Vital Signs: I have reviewed the initial vital signs.  Constitutional: well-nourished, no acute distress, normocephalic  Eyes: PERRLA, EOMI,  clear conjunctiva  ENT: MMM,   Cardiovascular: regular rate, regular rhythm, no murmur appreciated  Respiratory: unlabored respiratory effort, clear to auscultation bilaterally  Gastrointestinal: soft, +tenderness to right lower abdomen, no rebound,  non-distended  abdomen, no pulsatile mass  Musculoskeletal: supple neck, no lower extremity edema, no bony tenderness  Integumentary: warm, dry, no rash  Neurologic: awake, alert, cranial nerves II-XII grossly intact, extremities’ motor and sensory functions grossly intact, no focal deficits

## 2022-01-08 NOTE — ED PROVIDER NOTE - PROGRESS NOTE DETAILS
ATTENDING NOTE:   67 y/o F with PMH of terminal ileitis (DX’d 3 weeks ago and placed on ABX), diverticulitis, HTN, and migraines comes in c/o crampy abdominal pain x few days, worse with PO intake. Notes pain worsened yesterday so decided to come to the ED for evaluation today. Pt has a colonoscopy scheduled for 1/22. No f/c, n/v/d, black or bloody stools, urinary SX.   On exam: Pt in NAD, AAOX3. Head NCAT. CN II-XII intact. Lungs CTABL. CV S1S2 regular. Abdomen soft NTND, (+) BS. Extremities (-) edema. Motor 5/5 x4, sensation intact.  Plan for labs and reevaluate. ATTENDING NOTE:   67 y/o F with PMH of terminal ileitis (DX’d 3 weeks ago and placed on ABX), diverticulitis, HTN, and migraines comes in c/o crampy abdominal pain x few days, worse with PO intake. Notes pain worsened yesterday so decided to come to the ED for evaluation today. Pt has a colonoscopy scheduled for 1/22. No f/c, n/v/d, black or bloody stools, urinary SX.   On exam: Pt in NAD, AAOX3. Head NCAT. CN II-XII intact. Lungs CTABL. CV S1S2 regular. Abdomen soft/TTP over RLQ/ND/(+) BS. Extremities (-) edema. Motor 5/5 x4, sensation intact.  Plan for labs/CT and reevaluate.

## 2022-01-08 NOTE — H&P ADULT - HISTORY OF PRESENT ILLNESS
65 yo F PMH HTN, diverticulitis. 3 weeks ago seen in ED for ileitis dced on PO Levoquin + Flagyl. Presents with worsening abdominal pain x 2 days. Patient states that she has dull generalized pain throughout stomach area which is resolved with BM. Reports BM this morning NB/NB. Denies chest pain, SOB, fever, chills, cough, N/V/D, dizziness, weakness, recent travel, and any other acute complaints at this time.

## 2022-01-08 NOTE — ED ADULT NURSE NOTE - NSICDXPASTMEDICALHX_GEN_ALL_CORE_FT
PAST MEDICAL HISTORY:  Diverticulitis     HTN (hypertension)     Ileitis     Migraine     Terminal ileitis

## 2022-01-08 NOTE — ED PROVIDER NOTE - OBJECTIVE STATEMENT
67 y/o female presents to the Ed with abdominal pain worsening with food intake x few days . patient dx with terminal ileitis 3 weeks ago, placed on antibx. patient has appt with colonosopy on 1/22. patient denies any black or bloody stools. no nausea or fevers. patient c/o crampy abdominal pain. no dysuria or hematuria.

## 2022-01-08 NOTE — H&P ADULT - NSHPLABSRESULTS_GEN_ALL_CORE
LABS:                          14.0   10.96 )-----------( 373      ( 08 Jan 2022 12:40 )             43.1     01-08    135  |  99  |  8<L>  ----------------------------<  99  3.5   |  22  |  0.8    Ca    9.4      08 Jan 2022 12:40    TPro  6.2  /  Alb  3.7  /  TBili  0.5  /  DBili  x   /  AST  8   /  ALT  6   /  AlkPhos  90  01-08    LIVER FUNCTIONS - ( 08 Jan 2022 12:40 )  Alb: 3.7 g/dL / Pro: 6.2 g/dL / ALK PHOS: 90 U/L / ALT: 6 U/L / AST: 8 U/L / GGT: x    < from: CT Abdomen and Pelvis w/ IV Cont (01.08.22 @ 14:43) >    Persistent acute terminal/distal ileitis involving 35 cm segment of   bowel. Two new probable abscesses contiguous with ileum, measuring up to   1.5 x 1 cm and 1.8 x 1.4 cm.

## 2022-01-08 NOTE — H&P ADULT - NSHPPHYSICALEXAM_GEN_ALL_CORE
PHYSICAL EXAM:    Constitutional: NAD, A + O x 3. Appears comfortable.     Eyes: PEERL, EOM intact b/l.     Neck: Supple, non-tender, trachea midline.     Respiratory: CTA b/l. No rhonchi/ rales/ wheeze.     Cardiovascular: S1/ S2 present and clear, no murmur, gallops, or rub.     Gastrointestinal: Abd soft + non-tender. BS NA x 4 quadrants.     Extremities: No edema, clubbing, or cyanosis in Ext x 4.     Vascular: DP + radial Pulse 2+ b/l.     ABD: Soft + non-tender to palpation. No focal masses noted. BS NA x 4 quads. No guarding.     Skin: No rash/ focal lesions.

## 2022-01-09 LAB
ALBUMIN SERPL ELPH-MCNC: 3.1 G/DL — LOW (ref 3.5–5.2)
ALP SERPL-CCNC: 77 U/L — SIGNIFICANT CHANGE UP (ref 30–115)
ALT FLD-CCNC: <5 U/L — SIGNIFICANT CHANGE UP (ref 0–41)
ANION GAP SERPL CALC-SCNC: 10 MMOL/L — SIGNIFICANT CHANGE UP (ref 7–14)
AST SERPL-CCNC: 7 U/L — SIGNIFICANT CHANGE UP (ref 0–41)
BILIRUB SERPL-MCNC: 0.3 MG/DL — SIGNIFICANT CHANGE UP (ref 0.2–1.2)
BUN SERPL-MCNC: 7 MG/DL — LOW (ref 10–20)
CALCIUM SERPL-MCNC: 8.7 MG/DL — SIGNIFICANT CHANGE UP (ref 8.5–10.1)
CHLORIDE SERPL-SCNC: 101 MMOL/L — SIGNIFICANT CHANGE UP (ref 98–110)
CO2 SERPL-SCNC: 24 MMOL/L — SIGNIFICANT CHANGE UP (ref 17–32)
CREAT SERPL-MCNC: 0.6 MG/DL — LOW (ref 0.7–1.5)
GLUCOSE SERPL-MCNC: 96 MG/DL — SIGNIFICANT CHANGE UP (ref 70–99)
HCT VFR BLD CALC: 38 % — SIGNIFICANT CHANGE UP (ref 37–47)
HGB BLD-MCNC: 12.7 G/DL — SIGNIFICANT CHANGE UP (ref 12–16)
MCHC RBC-ENTMCNC: 30.3 PG — SIGNIFICANT CHANGE UP (ref 27–31)
MCHC RBC-ENTMCNC: 33.4 G/DL — SIGNIFICANT CHANGE UP (ref 32–37)
MCV RBC AUTO: 90.7 FL — SIGNIFICANT CHANGE UP (ref 81–99)
NRBC # BLD: 0 /100 WBCS — SIGNIFICANT CHANGE UP (ref 0–0)
PLATELET # BLD AUTO: 330 K/UL — SIGNIFICANT CHANGE UP (ref 130–400)
POTASSIUM SERPL-MCNC: 4.2 MMOL/L — SIGNIFICANT CHANGE UP (ref 3.5–5)
POTASSIUM SERPL-SCNC: 4.2 MMOL/L — SIGNIFICANT CHANGE UP (ref 3.5–5)
PROT SERPL-MCNC: 5.6 G/DL — LOW (ref 6–8)
RBC # BLD: 4.19 M/UL — LOW (ref 4.2–5.4)
RBC # FLD: 12.3 % — SIGNIFICANT CHANGE UP (ref 11.5–14.5)
SODIUM SERPL-SCNC: 135 MMOL/L — SIGNIFICANT CHANGE UP (ref 135–146)
WBC # BLD: 9.43 K/UL — SIGNIFICANT CHANGE UP (ref 4.8–10.8)
WBC # FLD AUTO: 9.43 K/UL — SIGNIFICANT CHANGE UP (ref 4.8–10.8)

## 2022-01-09 RX ORDER — HYDROXYZINE HCL 10 MG
25 TABLET ORAL ONCE
Refills: 0 | Status: COMPLETED | OUTPATIENT
Start: 2022-01-09 | End: 2022-01-09

## 2022-01-09 RX ORDER — LISINOPRIL 2.5 MG/1
40 TABLET ORAL DAILY
Refills: 0 | Status: DISCONTINUED | OUTPATIENT
Start: 2022-01-10 | End: 2022-01-11

## 2022-01-09 RX ORDER — ALPRAZOLAM 0.25 MG
0.25 TABLET ORAL ONCE
Refills: 0 | Status: DISCONTINUED | OUTPATIENT
Start: 2022-01-09 | End: 2022-01-09

## 2022-01-09 RX ORDER — HYDROMORPHONE HYDROCHLORIDE 2 MG/ML
1 INJECTION INTRAMUSCULAR; INTRAVENOUS; SUBCUTANEOUS ONCE
Refills: 0 | Status: DISCONTINUED | OUTPATIENT
Start: 2022-01-09 | End: 2022-01-09

## 2022-01-09 RX ORDER — LISINOPRIL 2.5 MG/1
20 TABLET ORAL ONCE
Refills: 0 | Status: COMPLETED | OUTPATIENT
Start: 2022-01-09 | End: 2022-01-09

## 2022-01-09 RX ADMIN — Medication 100 MILLIGRAM(S): at 22:35

## 2022-01-09 RX ADMIN — LISINOPRIL 20 MILLIGRAM(S): 2.5 TABLET ORAL at 05:52

## 2022-01-09 RX ADMIN — HYDROMORPHONE HYDROCHLORIDE 1 MILLIGRAM(S): 2 INJECTION INTRAMUSCULAR; INTRAVENOUS; SUBCUTANEOUS at 01:01

## 2022-01-09 RX ADMIN — Medication 100 MILLIGRAM(S): at 05:53

## 2022-01-09 RX ADMIN — PANTOPRAZOLE SODIUM 40 MILLIGRAM(S): 20 TABLET, DELAYED RELEASE ORAL at 05:52

## 2022-01-09 RX ADMIN — CEFEPIME 100 MILLIGRAM(S): 1 INJECTION, POWDER, FOR SOLUTION INTRAMUSCULAR; INTRAVENOUS at 05:52

## 2022-01-09 RX ADMIN — LISINOPRIL 20 MILLIGRAM(S): 2.5 TABLET ORAL at 18:03

## 2022-01-09 RX ADMIN — Medication 100 MILLIGRAM(S): at 16:07

## 2022-01-09 RX ADMIN — ATENOLOL 25 MILLIGRAM(S): 25 TABLET ORAL at 05:52

## 2022-01-09 RX ADMIN — HEPARIN SODIUM 5000 UNIT(S): 5000 INJECTION INTRAVENOUS; SUBCUTANEOUS at 17:21

## 2022-01-09 RX ADMIN — SODIUM CHLORIDE 75 MILLILITER(S): 9 INJECTION, SOLUTION INTRAVENOUS at 20:01

## 2022-01-09 RX ADMIN — Medication 10 MILLIGRAM(S): at 01:01

## 2022-01-09 RX ADMIN — CEFEPIME 100 MILLIGRAM(S): 1 INJECTION, POWDER, FOR SOLUTION INTRAMUSCULAR; INTRAVENOUS at 17:21

## 2022-01-09 RX ADMIN — Medication 25 MILLIGRAM(S): at 22:56

## 2022-01-09 RX ADMIN — Medication 0.25 MILLIGRAM(S): at 09:57

## 2022-01-09 RX ADMIN — HEPARIN SODIUM 5000 UNIT(S): 5000 INJECTION INTRAVENOUS; SUBCUTANEOUS at 05:52

## 2022-01-09 RX ADMIN — HYDROMORPHONE HYDROCHLORIDE 1 MILLIGRAM(S): 2 INJECTION INTRAMUSCULAR; INTRAVENOUS; SUBCUTANEOUS at 02:05

## 2022-01-09 NOTE — CONSULT NOTE ADULT - SUBJECTIVE AND OBJECTIVE BOX
Reason for Admission: Abdominal Pain  History of Present Illness:   67 yo F PMH HTN, diverticulitis. seen in ED 3 weeks ago for ileitis dced on PO Levoquin + Flagyl. Presents with worsening abdominal pain x 2 days. Patient states that she has dull generalized pain throughout stomach area which is relieved with BM. Reports last  BM this morning NB/NB. Denies chest pain, SOB, fever, chills, cough, N/V/D, dizziness, weakness, recent travel, and any other acute complaints at this time.        Review of Systems:  Other Review of Systems: All other review of systems negative, except as noted in HPI      Allergies and Intolerances:        Allergies:  	morphine: Drug, Unknown, Originally Entered as [Unknown] reaction to [morphine]    Home Medications:   * Patient Currently Takes Medications as of 08-Jan-2022 12:08 documented in Structured Notes  · 	atenolol 25 mg oral tablet: Last Dose Taken:  , 1 tab(s) orally once a day  · 	benazepril: Last Dose Taken:      Physical Exam:   Physical Exam: PHYSICAL EXAM:    Constitutional: NAD, A + O x 3. Appears comfortable.     Eyes: PEERL, EOM intact b/l.     Neck: Supple, non-tender, trachea midline.     Respiratory: CTA b/l. No rhonchi/ rales/ wheeze.     Cardiovascular: S1/ S2 present and clear, no murmur, gallops, or rub.     Gastrointestinal: Abd soft + diffuse lower -tender.+ BS x 4 quadrants.     Extremities: No edema, clubbing, or cyanosis in Ext x 4.     Vascular: DP + radial Pulse 2+ b/l.     ABD: Soft + non-tender to palpation. No focal masses noted. BS NA x 4 quads. No guarding.     Skin: No rash/ focal lesions.       Labs and Results:  Labs, Radiology, Cardiology, and Other Results: LABS:                          14.0   10.96 )-----------( 373      ( 08 Jan 2022 12:40 )             43.1     01-08    135  |  99  |  8<L>  ----------------------------<  99  3.5   |  22  |  0.8    Ca    9.4      08 Jan 2022 12:40    TPro  6.2  /  Alb  3.7  /  TBili  0.5  /  DBili  x   /  AST  8   /  ALT  6   /  AlkPhos  90  01-08    LIVER FUNCTIONS - ( 08 Jan 2022 12:40 )  Alb: 3.7 g/dL / Pro: 6.2 g/dL / ALK PHOS: 90 U/L / ALT: 6 U/L / AST: 8 U/L / GGT: x    < from: CT Abdomen and Pelvis w/ IV Cont (01.08.22 @ 14:43) >    Persistent acute terminal/distal ileitis involving 35 cm segment of   bowel. Two new probable abscesses contiguous with ileum, measuring up to   1.5 x 1 cm and 1.8 x 1.4 cm.   Reason for Admission: Abdominal Pain  History of Present Illness:   67 yo F PMH HTN, diverticulitis. seen in ED 3 weeks ago for ileitis dced on PO Levoquin + Flagyl. Presents with worsening abdominal pain x 2 days. Patient states that she has dull generalized pain throughout stomach area which is relieved with BM. Reports last  BM this morning NB/NB. Denies chest pain, SOB, fever, chills, cough, N/V/D, dizziness, weakness, recent travel, and any other acute complaints at this time.        Review of Systems:  Other Review of Systems: All other review of systems negative, except as noted in HPI      Allergies and Intolerances:        Allergies:  	morphine: Drug, Unknown, Originally Entered as [Unknown] reaction to [morphine]    Home Medications:   * Patient Currently Takes Medications as of 08-Jan-2022 12:08 documented in Structured Notes  · 	atenolol 25 mg oral tablet: Last Dose Taken:  , 1 tab(s) orally once a day  · 	benazepril: Last Dose Taken:  Vital Signs Last 24 Hrs  T(C): 36.9 (09 Jan 2022 18:32), Max: 37.5 (09 Jan 2022 08:50)  T(F): 98.4 (09 Jan 2022 18:32), Max: 99.5 (09 Jan 2022 08:50)  HR: 74 (09 Jan 2022 18:32) (68 - 94)  BP: 147/76 (09 Jan 2022 18:32) (134/76 - 162/84)  BP(mean): --  RR: 18 (09 Jan 2022 18:32) (16 - 18)  SpO2: 96% (09 Jan 2022 18:32) (95% - 97%)      Physical Exam:   Physical Exam: PHYSICAL EXAM:    Constitutional: NAD, A + O x 3. Appears comfortable.     Eyes: PEERL, EOM intact b/l.     Neck: Supple, non-tender, trachea midline.     Respiratory: CTA b/l. No rhonchi/ rales/ wheeze.     Cardiovascular: S1/ S2 present and clear, no murmur, gallops, or rub.     Gastrointestinal: + BS x 4 quadrants. Abd soft + diffuse lower tenderness.    Extremities: No edema, clubbing, or cyanosis in Ext x 4.     Vascular: DP + radial Pulse 2+ b/l.     ABD: Soft + non-tender to palpation. No focal masses noted. BS NA x 4 quads. No guarding.     Skin: No rash/ focal lesions.       Labs and Results:  Labs, Radiology, Cardiology, and Other Results: LABS:                          14.0   10.96 )-----------( 373      ( 08 Jan 2022 12:40 )             43.1     01-08    135  |  99  |  8<L>  ----------------------------<  99  3.5   |  22  |  0.8    Ca    9.4      08 Jan 2022 12:40    TPro  6.2  /  Alb  3.7  /  TBili  0.5  /  DBili  x   /  AST  8   /  ALT  6   /  AlkPhos  90  01-08    LIVER FUNCTIONS - ( 08 Jan 2022 12:40 )  Alb: 3.7 g/dL / Pro: 6.2 g/dL / ALK PHOS: 90 U/L / ALT: 6 U/L / AST: 8 U/L / GGT: x    < from: CT Abdomen and Pelvis w/ IV Cont (01.08.22 @ 14:43) >    Persistent acute terminal/distal ileitis involving 35 cm segment of   bowel. Two new probable abscesses contiguous with ileum, measuring up to   1.5 x 1 cm and 1.8 x 1.4 cm.

## 2022-01-09 NOTE — ED ADULT NURSE REASSESSMENT NOTE - NS ED NURSE REASSESS COMMENT FT1
BP elevated around 4pm. PA made aware. Ordered lisinopril 20 x1. Also Diet changed to clear liquid. Report given to ANNE Faust on 4N.

## 2022-01-09 NOTE — CONSULT NOTE ADULT - SUBJECTIVE AND OBJECTIVE BOX
Gastroenterology Consultation:    Patient is a 66y old  Female who presents with a chief complaint of Abdominal Pain (08 Jan 2022 18:33)      Admitted on: 01-08-22  HPI:  67 yo F PMH HTN, diverticulitis. 3 weeks ago seen in ED for ileitis dced on PO Levoquin + Flagyl. Presents with worsening abdominal pain x 2 days. Patient states that she has dull generalized pain throughout stomach area which is resolved with BM. Reports BM this morning NB/NB. Denies chest pain, SOB, fever, chills, cough, N/V/D, dizziness, weakness, recent travel, and any other acute complaints at this time.    (08 Jan 2022 18:33)      Prior records Reviewed (Y/N):  History obtained from person other than patient (Y/N):    Prior EGD:  Prior Colonoscopy:      PAST MEDICAL & SURGICAL HISTORY:  Migraine    HTN (hypertension)    Diverticulitis    Ileitis    Terminal ileitis        FAMILY HISTORY:      Social History:  Tobacco:  Alcohol:  Drugs:    Home Medications:  atenolol 25 mg oral tablet: 1 tab(s) orally once a day (08 Jan 2022 12:08)  benazepril:  (08 Jan 2022 12:08)    MEDICATIONS  (STANDING):  ATENolol  Tablet 25 milliGRAM(s) Oral daily  cefepime   IVPB 2000 milliGRAM(s) IV Intermittent every 12 hours  dextrose 5% + sodium chloride 0.45%. 1000 milliLiter(s) (75 mL/Hr) IV Continuous <Continuous>  heparin   Injectable 5000 Unit(s) SubCutaneous every 12 hours  lisinopril 20 milliGRAM(s) Oral daily  metroNIDAZOLE  IVPB 500 milliGRAM(s) IV Intermittent every 8 hours  pantoprazole    Tablet 40 milliGRAM(s) Oral before breakfast    MEDICATIONS  (PRN):  morphine  - Injectable 1 milliGRAM(s) IV Push every 6 hours PRN Severe Pain (7 - 10)      Allergies  No Known Allergies      Physical Examination:  T(C): 37.2 (01-09-22 @ 10:57), Max: 37.5 (01-09-22 @ 08:50)  HR: 69 (01-09-22 @ 10:57) (69 - 94)  BP: 148/78 (01-09-22 @ 10:57) (148/78 - 190/100)  RR: 18 (01-09-22 @ 10:57) (16 - 20)  SpO2: 97% (01-09-22 @ 10:57) (95% - 98%)        Constitutional: No acute distress.  Eyes:. Conjunctivae are clear, Sclera is non-icteric.  Ears Nose and Throat: The external ears are normal appearing,  Oral mucosa is pink and moist.  Respiratory:  No signs of respiratory distress. Lung sounds are clear bilaterally.  Cardiovascular:  S1 S2, Regular rate and rhythm.  GI: Abdomen is soft, symmetric, and tender . Bowel sounds are present   Neuro: No Tremor, No involuntary movements  Skin: No rashes, No Jaundice.          Data: (reviewed by attending)                        12.7   9.43  )-----------( 330      ( 09 Jan 2022 06:01 )             38.0     Hgb Trend:  12.7  01-09-22 @ 06:01  14.0  01-08-22 @ 12:40      01-09    135  |  101  |  7<L>  ----------------------------<  96  4.2   |  24  |  0.6<L>    Ca    8.7      09 Jan 2022 06:01    TPro  5.6<L>  /  Alb  3.1<L>  /  TBili  0.3  /  DBili  x   /  AST  7   /  ALT  <5  /  AlkPhos  77  01-09    Liver panel trend:  TBili 0.3   /   AST 7   /   ALT <5   /   AlkP 77   /   Tptn 5.6   /   Alb 3.1    /   DBili --      01-09  TBili 0.5   /   AST 8   /   ALT 6   /   AlkP 90   /   Tptn 6.2   /   Alb 3.7    /   DBili --      01-08              Radiology:(reviewed by attending)  CT Abdomen and Pelvis w/ IV Cont:   ACC: [id number] EXAM:  CT ABDOMEN AND PELVIS IC                          PROCEDURE DATE:  01/08/2022          INTERPRETATION:  CLINICAL STATEMENT: Abdominal pain.    TECHNIQUE: Contiguous axial CT images were obtained from the lower chest   to the pubic symphysis following administration of 100cc Optiray 320   intravenous contrast.  Oral contrast was not administered.  Reformatted   images in the coronal and sagittal planes were acquired.    Comparison made with CT abdomen and pelvis December 7, 2021    FINDINGS:    LOWER CHEST: Bibasilar subsegmental atelectasis.    HEPATOBILIARY: Unremarkable.    SPLEEN: Unremarkable.    PANCREAS: Unremarkable.    ADRENAL GLANDS: Unremarkable.    KIDNEYS: No hydronephrosis. Bilateral subcentimeter renal hypodensities,   too small to characterize. Punctate nonobstructing right renal calculus.    ABDOMINOPELVIC NODES: Unremarkable.    PELVIC ORGANS: Unremarkable.    PERITONEUM/MESENTERY/BOWEL: Approximate 35 cm long segment of   terminal/distal ileum withcircumferential wall thickening, mucosal   hyperenhancement, surrounding inflammatory change, similar in length to   prior study. Two new probable abscesses contiguous with ileum, measuring   up to 1.5 x 1 cm and 1.8 x 1.4 cm (series 4, image 246; and series 601,   image 67, series 4, image 236). No bowel obstruction. No gross   pneumoperitoneum. Colonic diverticulosis. No bowel obstruction.    BONES/SOFT TISSUES: Unremarkable.    OTHER: Vascular calcifications.      IMPRESSION:  Since December 7, 2021:    Persistent acute terminal/distal ileitis involving 35 cm segment of   bowel. Two new probable abscesses contiguous with ileum, measuring up to   1.5 x 1 cm and 1.8 x 1.4 cm. Correlate for history of Crohn's disease.    --- End of Report ---            DEEPAK DAWN MD; Attending Radiologist  This document has been electronically signed. Jan 8 2022  3:47PM (01-08-22 @ 14:43)

## 2022-01-09 NOTE — CONSULT NOTE ADULT - ASSESSMENT
Acute terminal/distal ileitis  PAIN MGMT  IV  ABX   ZOFRAN PRN NAUSEA  Acute terminal/distal ileitis  PAIN MGMT-dilaudid  IVF  D5/1/2 NS  IV  ABX -cefepime ,flagyl  ZOFRAN PRN NAUSEA  diet- clear liquids  ID  CONSULT  DVT/  GI  PROPHYLAXIS--HEPARIN SC/ PROTONIX

## 2022-01-09 NOTE — CONSULT NOTE ADULT - ASSESSMENT
impression  : terminal ileitis non responsive to  po antibiotics  plan  iv antibiotics  id consult  surgical consult for possible intervention impression  : terminal ileitis non responsive to  po antibiotics  plan    clear liquid diet   iv antibiotics  id consult  surgical consult for possible intervention  consider adding Remicade

## 2022-01-10 LAB
HCV AB S/CO SERPL IA: 0.03 COI — SIGNIFICANT CHANGE UP
HCV AB SERPL-IMP: SIGNIFICANT CHANGE UP

## 2022-01-10 PROCEDURE — 99233 SBSQ HOSP IP/OBS HIGH 50: CPT

## 2022-01-10 RX ORDER — HYDROMORPHONE HYDROCHLORIDE 2 MG/ML
1 INJECTION INTRAMUSCULAR; INTRAVENOUS; SUBCUTANEOUS EVERY 8 HOURS
Refills: 0 | Status: DISCONTINUED | OUTPATIENT
Start: 2022-01-10 | End: 2022-01-11

## 2022-01-10 RX ORDER — HYDROXYZINE HCL 10 MG
50 TABLET ORAL AT BEDTIME
Refills: 0 | Status: DISCONTINUED | OUTPATIENT
Start: 2022-01-10 | End: 2022-01-11

## 2022-01-10 RX ADMIN — ATENOLOL 25 MILLIGRAM(S): 25 TABLET ORAL at 06:02

## 2022-01-10 RX ADMIN — PANTOPRAZOLE SODIUM 40 MILLIGRAM(S): 20 TABLET, DELAYED RELEASE ORAL at 06:02

## 2022-01-10 RX ADMIN — LISINOPRIL 40 MILLIGRAM(S): 2.5 TABLET ORAL at 06:02

## 2022-01-10 RX ADMIN — HYDROMORPHONE HYDROCHLORIDE 1 MILLIGRAM(S): 2 INJECTION INTRAMUSCULAR; INTRAVENOUS; SUBCUTANEOUS at 00:13

## 2022-01-10 RX ADMIN — Medication 100 MILLIGRAM(S): at 14:27

## 2022-01-10 RX ADMIN — Medication 100 MILLIGRAM(S): at 21:09

## 2022-01-10 RX ADMIN — HYDROMORPHONE HYDROCHLORIDE 1 MILLIGRAM(S): 2 INJECTION INTRAMUSCULAR; INTRAVENOUS; SUBCUTANEOUS at 21:40

## 2022-01-10 RX ADMIN — Medication 100 MILLIGRAM(S): at 06:02

## 2022-01-10 RX ADMIN — HYDROMORPHONE HYDROCHLORIDE 1 MILLIGRAM(S): 2 INJECTION INTRAMUSCULAR; INTRAVENOUS; SUBCUTANEOUS at 00:16

## 2022-01-10 RX ADMIN — CEFEPIME 100 MILLIGRAM(S): 1 INJECTION, POWDER, FOR SOLUTION INTRAMUSCULAR; INTRAVENOUS at 06:02

## 2022-01-10 RX ADMIN — Medication 50 MILLIGRAM(S): at 21:09

## 2022-01-10 RX ADMIN — CEFEPIME 100 MILLIGRAM(S): 1 INJECTION, POWDER, FOR SOLUTION INTRAMUSCULAR; INTRAVENOUS at 17:11

## 2022-01-10 NOTE — CONSULT NOTE ADULT - SUBJECTIVE AND OBJECTIVE BOX
GARDENIA HATHAWAY  66y, Female  Allergy: No Known Allergies      CHIEF COMPLAINT: Abdominal Pain (10 Reinaldo 2022 11:33)      LOS  2d    HPI:  67 yo F PMH HTN, diverticulitis. 3 weeks ago seen in ED for ileitis dced on PO Levoquin + Flagyl. Presents with worsening abdominal pain x 2 days. Patient states that she has dull generalized pain throughout stomach area which is resolved with BM. Reports BM this morning NB/NB. Denies chest pain, SOB, fever, chills, cough, N/V/D, dizziness, weakness, recent travel, and any other acute complaints at this time.    (08 Jan 2022 18:33    INFECTIOUS DISEASE HISTORY:  History as above.   Seen in ED 12/7 with CT Abd/pelvis showing wall thickening of distal/terminal illeum withou abscess  She was treated with course of Levofloxacin/flagyl for 5 days.   CT Abd/pelvis showing probable abscess with ileum, largest 1.8x 1.4 cm in size.  She has been afebrile and without leukocytosis.   She denies any diarrhea currently, but bowel movements have been painful.   She has been started on cefepime/flagyl while here.   She denies any recent travel.   Denies any ingestion of raw pork products.   Denies any fevers, rashes, new joint pains at home.     PAST MEDICAL & SURGICAL HISTORY:  Migraine    HTN (hypertension)    Diverticulitis    Ileitis    Terminal ileitis        FAMILY HISTORY  Family Hx reviewed and non-contributory     SOCIAL HISTORY  Social History:  Patient is an active smoker x 45 years, 1 PPD.   Denies recreational drug/ ETOH use. (08 Jan 2022 18:33)        ROS  General: Denies rigors, nightsweats  HEENT: Denies headache, rhinorrhea, sore throat, eye pain  CV: Denies CP, palpitations  PULM: Denies wheezing, hemoptysis  GI: Denies hematemesis, hematochezia, melena  : Denies discharge, hematuria  MSK: Denies arthralgias, myalgias  SKIN: Denies rash, lesions  NEURO: Denies paresthesias, weakness  PSYCH: Denies depression, anxiety    VITALS:  T(F): 97.5, Max: 98.8 (01-09-22 @ 15:47)  HR: 69  BP: 154/83  RR: 18Vital Signs Last 24 Hrs  T(C): 36.4 (10 Reinaldo 2022 04:38), Max: 37.1 (09 Jan 2022 15:47)  T(F): 97.5 (10 Reinaldo 2022 04:38), Max: 98.8 (09 Jan 2022 15:47)  HR: 69 (10 Reinaldo 2022 04:38) (68 - 74)  BP: 154/83 (10 Reinaldo 2022 04:38) (134/76 - 164/83)  BP(mean): --  RR: 18 (10 Reinaldo 2022 04:38) (18 - 18)  SpO2: 96% (09 Jan 2022 18:32) (96% - 97%)    PHYSICAL EXAM:  Gen: NAD, resting in bed  HEENT: Normocephalic, atraumatic  Neck: supple, no lymphadenopathy  CV: Regular rate & regular rhythm  Lungs: decreased BS at bases, no fremitus  Abdomen: Soft, BS present; tenderness in lower quadrants   Ext: Warm, well perfused  Neuro: non focal, awake  Skin: no rash, no erythema  Lines: no phlebitis    TESTS & MEASUREMENTS:                        12.7   9.43  )-----------( 330      ( 09 Jan 2022 06:01 )             38.0     01-09    135  |  101  |  7<L>  ----------------------------<  96  4.2   |  24  |  0.6<L>    Ca    8.7      09 Jan 2022 06:01    TPro  5.6<L>  /  Alb  3.1<L>  /  TBili  0.3  /  DBili  x   /  AST  7   /  ALT  <5  /  AlkPhos  77  01-09      LIVER FUNCTIONS - ( 09 Jan 2022 06:01 )  Alb: 3.1 g/dL / Pro: 5.6 g/dL / ALK PHOS: 77 U/L / ALT: <5 U/L / AST: 7 U/L / GGT: x               Culture - Urine (collected 12-07-21 @ 17:20)  Source: Clean Catch Clean Catch (Midstream)  Final Report (12-08-21 @ 21:38):    <10,000 CFU/mL Normal Urogenital Kaylen        Lactate, Blood: 1.3 mmol/L (01-08-22 @ 12:40)      INFECTIOUS DISEASES TESTING  COVID-19 PCR: NotDetec (01-08-22 @ 16:04)      RADIOLOGY & ADDITIONAL TESTS:  I have personally reviewed the last Chest xray  CXR      CT  CT Abdomen and Pelvis w/ IV Cont:   ACC: [id number] EXAM:  CT ABDOMEN AND PELVIS IC                          PROCEDURE DATE:  01/08/2022          INTERPRETATION:  CLINICAL STATEMENT: Abdominal pain.    TECHNIQUE: Contiguous axial CT images were obtained from the lower chest   to the pubic symphysis following administration of 100cc Optiray 320   intravenous contrast.  Oral contrast was not administered.  Reformatted   images in the coronal and sagittal planes were acquired.    Comparison made with CT abdomen and pelvis December 7, 2021    FINDINGS:    LOWER CHEST: Bibasilar subsegmental atelectasis.    HEPATOBILIARY: Unremarkable.    SPLEEN: Unremarkable.    PANCREAS: Unremarkable.    ADRENAL GLANDS: Unremarkable.    KIDNEYS: No hydronephrosis. Bilateral subcentimeter renal hypodensities,   too small to characterize. Punctate nonobstructing right renal calculus.    ABDOMINOPELVIC NODES: Unremarkable.    PELVIC ORGANS: Unremarkable.    PERITONEUM/MESENTERY/BOWEL: Approximate 35 cm long segment of   terminal/distal ileum withcircumferential wall thickening, mucosal   hyperenhancement, surrounding inflammatory change, similar in length to   prior study. Two new probable abscesses contiguous with ileum, measuring   up to 1.5 x 1 cm and 1.8 x 1.4 cm (series 4, image 246; and series 601,   image 67, series 4, image 236). No bowel obstruction. No gross   pneumoperitoneum. Colonic diverticulosis. No bowel obstruction.    BONES/SOFT TISSUES: Unremarkable.    OTHER: Vascular calcifications.      IMPRESSION:  Since December 7, 2021:    Persistent acute terminal/distal ileitis involving 35 cm segment of   bowel. Two new probable abscesses contiguous with ileum, measuring up to   1.5 x 1 cm and 1.8 x 1.4 cm. Correlate for history of Crohn's disease.    --- End of Report ---            DEEPAK DAWN MD; Attending Radiologist  This document has been electronically signed. Jan 8 2022  3:47PM (01-08-22 @ 14:43)      CARDIOLOGY TESTING      MEDICATIONS  ATENolol  Tablet 25 Oral daily  cefepime   IVPB 2000 IV Intermittent every 12 hours  dextrose 5% + sodium chloride 0.45%. 1000 IV Continuous <Continuous>  dicyclomine 10 Oral once  heparin   Injectable 5000 SubCutaneous every 12 hours  hydrOXYzine hydrochloride 50 Oral at bedtime  lisinopril 40 Oral daily  metroNIDAZOLE  IVPB 500 IV Intermittent every 8 hours  pantoprazole    Tablet 40 Oral before breakfast      Weight  Weight (kg): 58.2 (01-09-22 @ 21:40)    ANTIBIOTICS:  cefepime   IVPB 2000 milliGRAM(s) IV Intermittent every 12 hours  metroNIDAZOLE  IVPB 500 milliGRAM(s) IV Intermittent every 8 hours      ALLERGIES:  No Known Allergies

## 2022-01-10 NOTE — CONSULT NOTE ADULT - ASSESSMENT
ASSESSMENT  65 yo F PMH HTN, diverticulitis. 3 weeks ago seen in ED for ileitis dced on PO Levoquin + Flagyy who prsents with worsening abdominal pain     IMPRESSION  #Terminal ileitis with abscess  - CT Abd/Pelvis 12/7: wall thickening of distal/terminal ileum without abscess  - treated with course of Levofloxacin/flagyl  - CT Abdomen and Pelvis w/ IV Cont (01.08.22 @ 14:43): Persistent acute terminal/distal ileitis involving 35 cm segment of bowel. Two new probable abscesses contiguous with ileum, measuring up to 1.5 x 1 cm and 1.8 x 1.4 cm. Correlate for history of Crohn's disease.    #Abx allergy: NKDA    RECOMMENDATIONS:  - no clear hx of IBD but eventually will need scope once acute infection resolved   - Terminal ileitis can be assocaited with Yersinia infection, Salmonella, Mycobacterial infections, Histoplasma -- but does not seem to have epidemiologic exposure or risk factors for this   - given abscess formation and unable to tolerate PO flagyl, plan for ertapenem 1g daily for at least 3 weeks via midline with repeat CT Abd/pelvis in 2 weeks to see if radiologic improvement   - if developing diarrhea here, please send for GI PCR     - Weekly CBC, CMP, ESR/CRP  - ID follow-up with Dr. Marcus Elliott for Telehealth. We will call the patient between 10:30-1:30      [address]       [phone]       Fax [phone]      Please call or message on [employer] Teams if with any questions.  Spectra [id number]

## 2022-01-11 ENCOUNTER — TRANSCRIPTION ENCOUNTER (OUTPATIENT)
Age: 67
End: 2022-01-11

## 2022-01-11 VITALS
TEMPERATURE: 98 F | SYSTOLIC BLOOD PRESSURE: 153 MMHG | DIASTOLIC BLOOD PRESSURE: 75 MMHG | RESPIRATION RATE: 18 BRPM | HEART RATE: 58 BPM

## 2022-01-11 LAB
AUTO DIFF PNL BLD: NEGATIVE — SIGNIFICANT CHANGE UP
C-ANCA SER-ACNC: NEGATIVE — SIGNIFICANT CHANGE UP
P-ANCA SER-ACNC: NEGATIVE — SIGNIFICANT CHANGE UP

## 2022-01-11 PROCEDURE — 36556 INSERT NON-TUNNEL CV CATH: CPT

## 2022-01-11 PROCEDURE — 99232 SBSQ HOSP IP/OBS MODERATE 35: CPT

## 2022-01-11 RX ORDER — KETOROLAC TROMETHAMINE 30 MG/ML
10 SYRINGE (ML) INJECTION ONCE
Refills: 0 | Status: DISCONTINUED | OUTPATIENT
Start: 2022-01-11 | End: 2022-01-11

## 2022-01-11 RX ORDER — KETOROLAC TROMETHAMINE 30 MG/ML
15 SYRINGE (ML) INJECTION ONCE
Refills: 0 | Status: DISCONTINUED | OUTPATIENT
Start: 2022-01-11 | End: 2022-01-11

## 2022-01-11 RX ORDER — ERTAPENEM SODIUM 1 G/1
1000 INJECTION, POWDER, LYOPHILIZED, FOR SOLUTION INTRAMUSCULAR; INTRAVENOUS ONCE
Refills: 0 | Status: COMPLETED | OUTPATIENT
Start: 2022-01-11 | End: 2022-01-11

## 2022-01-11 RX ORDER — ERTAPENEM SODIUM 1 G/1
1 INJECTION, POWDER, LYOPHILIZED, FOR SOLUTION INTRAMUSCULAR; INTRAVENOUS
Qty: 21 | Refills: 0
Start: 2022-01-11 | End: 2022-01-31

## 2022-01-11 RX ORDER — BENAZEPRIL HYDROCHLORIDE 40 MG/1
0 TABLET ORAL
Qty: 0 | Refills: 0 | DISCHARGE

## 2022-01-11 RX ORDER — KETOROLAC TROMETHAMINE 30 MG/ML
1 SYRINGE (ML) INJECTION
Qty: 12 | Refills: 0
Start: 2022-01-11 | End: 2022-01-14

## 2022-01-11 RX ADMIN — CEFEPIME 100 MILLIGRAM(S): 1 INJECTION, POWDER, FOR SOLUTION INTRAMUSCULAR; INTRAVENOUS at 05:02

## 2022-01-11 RX ADMIN — Medication 15 MILLIGRAM(S): at 01:08

## 2022-01-11 RX ADMIN — ATENOLOL 25 MILLIGRAM(S): 25 TABLET ORAL at 05:02

## 2022-01-11 RX ADMIN — LISINOPRIL 40 MILLIGRAM(S): 2.5 TABLET ORAL at 05:02

## 2022-01-11 RX ADMIN — PANTOPRAZOLE SODIUM 40 MILLIGRAM(S): 20 TABLET, DELAYED RELEASE ORAL at 05:02

## 2022-01-11 RX ADMIN — Medication 100 MILLIGRAM(S): at 05:02

## 2022-01-11 RX ADMIN — Medication 15 MILLIGRAM(S): at 16:17

## 2022-01-11 RX ADMIN — ERTAPENEM SODIUM 120 MILLIGRAM(S): 1 INJECTION, POWDER, LYOPHILIZED, FOR SOLUTION INTRAMUSCULAR; INTRAVENOUS at 15:19

## 2022-01-11 NOTE — DISCHARGE NOTE PROVIDER - HOSPITAL COURSE
Patient is a 66 year old Female with a past medical history of HTN, diverticulitis,  3 weeks ago seen in ED for ileitis DE'ed on PO Levoquin + Flagyl, presented on this admission with worsening abdominal pain x 2 days. CT abdomen Pelvis revealed, "Persistent acute terminal/distal ileitis involving 35 cm segment of bowel. Two new probable abscesses contiguous with ileum, measuring up to 1.5 x 1 cm and 1.8 x 1.4 cm." Patient was admitted and was started on Cefepime + Flagyl, Intravenous fluids, pain management, antiemetics and was slowly advanced diet. Patient to remain on full liquid diet for another 4 days and then slowly advance. Patient needs to repeat CT abdomen in 2 weeks. As per Infectious disease team "given abscess formation and unable to tolerate PO flagyl, plan for ertapenem 1g daily for at least 3 weeks via midline". Intravenous Antibiotics was arranged and Midline was placed. Patient will be discharged home and will follow up with Dr. Gomez, Dr. Queen and GI (scope).

## 2022-01-11 NOTE — DISCHARGE NOTE PROVIDER - NSDCMRMEDTOKEN_GEN_ALL_CORE_FT
atenolol 25 mg oral tablet: 1 tab(s) orally once a day  benazepril 40 mg oral tablet: 1 tab(s) orally once a day  ertapenem 1 g injection: 1 gram(s) intravenously every 24 hours

## 2022-01-11 NOTE — DISCHARGE NOTE PROVIDER - CARE PROVIDER_API CALL
Nura Gomez)  Medicine  [address] [place] [address]  Phone: [phone]  Fax: [phone]  Scheduled Appointment: 01/18/2022 05:00 PM    Marcus Queen)  Infectious Disease; Internal Medicine  [address] [place] [address]  Phone: [phone]  Fax: [phone]  Follow Up Time: 2 weeks    Batsheva Rodriguez  GASTROENTEROLOGY  [address] [place] [address]  Phone: [phone]  Fax: [phone]  Follow Up Time: 2 weeks

## 2022-01-11 NOTE — PROGRESS NOTE ADULT - ATTENDING COMMENTS
65 yo f with terminal ileitis and abscess.  see above for details  continue antibiotics  Followup with Dr Rodriguez as outpatient  I have reviewed the above history and examination, and have personally interviewed the patient and examined the patient and agree with the above impressions and recommendations.

## 2022-01-11 NOTE — DISCHARGE NOTE NURSING/CASE MANAGEMENT/SOCIAL WORK - NSDCPEFALRISK_GEN_ALL_CORE
For information on Fall & Injury Prevention, visit: [email or web address] OR  [email or web address] OR  https://www.cdc.gov/steadi/patient.html

## 2022-01-11 NOTE — DISCHARGE NOTE NURSING/CASE MANAGEMENT/SOCIAL WORK - PATIENT PORTAL LINK FT
You can access the FollowMyHealth Patient Portal offered by [facility] by registering at the following website: [email or web address]. By joining [facility]’s FollowMyHealth portal, you will also be able to view your health information using other applications (apps) compatible with our system.

## 2022-01-11 NOTE — PROGRESS NOTE ADULT - ASSESSMENT
65 yo female pmh HTN here for RLQ abdominal pain.    Problem 1-Persistent acute terminal/distal ileitis involving 35 cm segment of   bowel. Two new probable abscesses contiguous with ileum, measuring up to   1.5 x 1 cm and 1.8 x 1.4 cm. Correlate for history of Crohn's disease.  patient reports last colonoscopy was 7 years ago and Dr. rodriguez said she may have IBD but she was okay for the last 7 years and was not on any meds  Rec  -Continue abx  -Monitor stool for frequency, consistency blood  -If patient develops diarrhea check C-diff and GI PCR  -Patient will benefit from EGD/Colonoscopy and MRE as an outpatient once this acute episode resolves   -Patient to follow up with Dr. Rodriguez outpatient 
67 yo female pmh HTN here for RLQ abdominal pain.    Problem 1-Persistent acute terminal/distal ileitis involving 35 cm segment of   bowel. Two new probable abscesses contiguous with ileum, measuring up to   1.5 x 1 cm and 1.8 x 1.4 cm. Correlate for history of Crohn's disease.  patient reports last colonoscopy was 7 years ago and Dr. villarreal said she may have IBD but she was okay for the last 7 years and was not on any meds  Rec  -Continue abx  -Monitor stool for frequency, consistency blood  -If patient develops diarrhea check C-diff and GI PCR  -Patient will benefit from EGD/Colonoscopy and MRE as an outpatient once this acute episode resolves 
  ASSESSMENT  65 yo F PMH HTN, diverticulitis. 3 weeks ago seen in ED for ileitis dced on PO Levoquin + Flagyy who prsents with worsening abdominal pain     IMPRESSION  #Terminal ileitis with abscess  - CT Abd/Pelvis 12/7: wall thickening of distal/terminal ileum without abscess  - treated with course of Levofloxacin/flagyl  - CT Abdomen and Pelvis w/ IV Cont (01.08.22 @ 14:43): Persistent acute terminal/distal ileitis involving 35 cm segment of bowel. Two new probable abscesses contiguous with ileum, measuring up to 1.5 x 1 cm and 1.8 x 1.4 cm. Correlate for history of Crohn's disease.    #Abx allergy: NKDA    RECOMMENDATIONS  This is a preliminary incomplete pended note, all final recommendations to follow after interview and examination of the patient.    Please call or message on [employer] Teams if with any questions.  Spectra [phone]

## 2022-01-11 NOTE — DISCHARGE NOTE PROVIDER - PROVIDER TOKENS
PROVIDER:[TOKEN:[[id number]],SCHEDULEDAPPT:[01/18/2022],SCHEDULEDAPPTTIME:[05:00 PM]],PROVIDER:[TOKEN:[[id number]],FOLLOWUP:[2 weeks]],PROVIDER:[TOKEN:[[id number]],FOLLOWUP:[2 weeks]]

## 2022-01-11 NOTE — DISCHARGE NOTE PROVIDER - NSDCCPCAREPLAN_GEN_ALL_CORE_FT
PRINCIPAL DISCHARGE DIAGNOSIS  Diagnosis: Terminal ileitis with abscess  Assessment and Plan of Treatment: -  - Will discharge you with Intravenous Antibiotics for 3 weeks. You need to follow a full liquid diet for another 4 more days and slowly advance to soft food. Follow up with Dr. Gomez, GI and Infectious disease team. You need to do arepeat CT abdomen scan in 2 weeks.

## 2022-01-11 NOTE — PROGRESS NOTE ADULT - SUBJECTIVE AND OBJECTIVE BOX
Name: GARDENIA HATHAWAY  Age: 66y  Gender: Female    doing better today  l    Allergies:  No Known Allergies      PHYSICAL EXAM:    Vitals:  T(C): 36.5 (01-09-22 @ 21:40), Max: 37.5 (01-09-22 @ 08:50)  HR: 69 (01-09-22 @ 21:40) (68 - 94)  BP: 164/83 (01-09-22 @ 21:40) (134/76 - 164/83)  RR: 18 (01-09-22 @ 21:40) (16 - 18)  SpO2: 96% (01-09-22 @ 18:32) (95% - 97%)  Wt(kg): --Vital Signs Last 24 Hrs  T(C): 36.5 (09 Jan 2022 21:40), Max: 37.5 (09 Jan 2022 08:50)  T(F): 97.7 (09 Jan 2022 21:40), Max: 99.5 (09 Jan 2022 08:50)  HR: 69 (09 Jan 2022 21:40) (68 - 94)  BP: 164/83 (09 Jan 2022 21:40) (134/76 - 164/83)  BP(mean): --  RR: 18 (09 Jan 2022 21:40) (16 - 18)  SpO2: 96% (09 Jan 2022 18:32) (95% - 97%)      NECK: Supple, No JVD  CHEST/LUNG: CTA, B/L, No rales, rhonchi, wheezing, or rubs  HEART: S1,S2, N1 Regular rate and rhythm; No murmurs, rubs, or gallops  ABDOMEN: Soft, tender, NG,  Nondistended; Bowel sounds present  EXTREMITIES:  2+ Peripheral Pulses, No clubbing, cyanosis, or edema      LABS:                        12.7   9.43  )-----------( 330      ( 09 Jan 2022 06:01 )             38.0     01-09    135  |  101  |  7<L>  ----------------------------<  96  4.2   |  24  |  0.6<L>    Ca    8.7      09 Jan 2022 06:01    TPro  5.6<L>  /  Alb  3.1<L>  /  TBili  0.3  /  DBili  x   /  AST  7   /  ALT  <5  /  AlkPhos  77  01-09    LIVER FUNCTIONS - ( 09 Jan 2022 06:01 )  Alb: 3.1 g/dL / Pro: 5.6 g/dL / ALK PHOS: 77 U/L / ALT: <5 U/L / AST: 7 U/L / GGT: x                 MEDICATIONS  (STANDING):  ATENolol  Tablet 25 milliGRAM(s) Oral daily  cefepime   IVPB 2000 milliGRAM(s) IV Intermittent every 12 hours  dextrose 5% + sodium chloride 0.45%. 1000 milliLiter(s) (75 mL/Hr) IV Continuous <Continuous>  dicyclomine 10 milliGRAM(s) Oral once  heparin   Injectable 5000 Unit(s) SubCutaneous every 12 hours  HYDROmorphone  Injectable 1 milliGRAM(s) IV Push once  metroNIDAZOLE  IVPB 500 milliGRAM(s) IV Intermittent every 8 hours  pantoprazole    Tablet 40 milliGRAM(s) Oral before breakfast        RADIOLOGY & ADDITIONAL TESTS:    Imaging Personally Reviewed:  [ ] YES  [ ] NO    A/P:  1.  Terminal ileitis             appreciate GI and Surg consult           IV abx, clears as per GI           Will obtain an ID consukt for abx duration          f/u Dr Rodriguez GI
RIVAS GARDENIA  66y, Female  Allergy: No Known Allergies      CHIEF COMPLAINT: Abdominal Pain (10 Reinaldo 2022 11:33)      LOS  2d    HPI:  65 yo F PMH HTN, diverticulitis. 3 weeks ago seen in ED for ileitis dced on PO Levoquin + Flagyl. Presents with worsening abdominal pain x 2 days. Patient states that she has dull generalized pain throughout stomach area which is resolved with BM. Reports BM this morning NB/NB. Denies chest pain, SOB, fever, chills, cough, N/V/D, dizziness, weakness, recent travel, and any other acute complaints at this time.    (08 Jan 2022 18:33    INFECTIOUS DISEASE HISTORY:  History as above.   Seen in ED 12/7 with CT Abd/pelvis showing wall thickening of distal/terminal illeum withou abscess  She was treated with course of Levofloxacin/flagyl  CT Abd/pelvis showing to ew probable abscess with ileum, the larged 1.8x 1.4 cm in size.  She has been afebrile and without leukocytosis.     PAST MEDICAL & SURGICAL HISTORY:  Migraine    HTN (hypertension)    Diverticulitis    Ileitis    Terminal ileitis        FAMILY HISTORY  Family Hx reviewed and non-contributory     SOCIAL HISTORY  Social History:  Patient is an active smoker x 45 years, 1 PPD.   Denies recreational drug/ ETOH use. (08 Jan 2022 18:33)        ROS  General: Denies rigors, nightsweats  HEENT: Denies headache, rhinorrhea, sore throat, eye pain  CV: Denies CP, palpitations  PULM: Denies wheezing, hemoptysis  GI: Denies hematemesis, hematochezia, melena  : Denies discharge, hematuria  MSK: Denies arthralgias, myalgias  SKIN: Denies rash, lesions  NEURO: Denies paresthesias, weakness  PSYCH: Denies depression, anxiety    VITALS:  T(F): 97.5, Max: 98.8 (01-09-22 @ 15:47)  HR: 69  BP: 154/83  RR: 18Vital Signs Last 24 Hrs  T(C): 36.4 (10 Reinaldo 2022 04:38), Max: 37.1 (09 Jan 2022 15:47)  T(F): 97.5 (10 Reinaldo 2022 04:38), Max: 98.8 (09 Jan 2022 15:47)  HR: 69 (10 Reinaldo 2022 04:38) (68 - 74)  BP: 154/83 (10 Reinaldo 2022 04:38) (134/76 - 164/83)  BP(mean): --  RR: 18 (10 Reinaldo 2022 04:38) (18 - 18)  SpO2: 96% (09 Jan 2022 18:32) (96% - 97%)    PHYSICAL EXAM:  Gen: NAD, resting in bed  HEENT: Normocephalic, atraumatic  Neck: supple, no lymphadenopathy  CV: Regular rate & regular rhythm  Lungs: decreased BS at bases, no fremitus  Abdomen: Soft, BS present  Ext: Warm, well perfused  Neuro: non focal, awake  Skin: no rash, no erythema  Lines: no phlebitis    TESTS & MEASUREMENTS:                        12.7   9.43  )-----------( 330      ( 09 Jan 2022 06:01 )             38.0     01-09    135  |  101  |  7<L>  ----------------------------<  96  4.2   |  24  |  0.6<L>    Ca    8.7      09 Jan 2022 06:01    TPro  5.6<L>  /  Alb  3.1<L>  /  TBili  0.3  /  DBili  x   /  AST  7   /  ALT  <5  /  AlkPhos  77  01-09      LIVER FUNCTIONS - ( 09 Jan 2022 06:01 )  Alb: 3.1 g/dL / Pro: 5.6 g/dL / ALK PHOS: 77 U/L / ALT: <5 U/L / AST: 7 U/L / GGT: x               Culture - Urine (collected 12-07-21 @ 17:20)  Source: Clean Catch Clean Catch (Midstream)  Final Report (12-08-21 @ 21:38):    <10,000 CFU/mL Normal Urogenital Kaylen        Lactate, Blood: 1.3 mmol/L (01-08-22 @ 12:40)      INFECTIOUS DISEASES TESTING  COVID-19 PCR: NotDetec (01-08-22 @ 16:04)      RADIOLOGY & ADDITIONAL TESTS:  I have personally reviewed the last Chest xray  CXR      CT  CT Abdomen and Pelvis w/ IV Cont:   ACC: [id number] EXAM:  CT ABDOMEN AND PELVIS IC                          PROCEDURE DATE:  01/08/2022          INTERPRETATION:  CLINICAL STATEMENT: Abdominal pain.    TECHNIQUE: Contiguous axial CT images were obtained from the lower chest   to the pubic symphysis following administration of 100cc Optiray 320   intravenous contrast.  Oral contrast was not administered.  Reformatted   images in the coronal and sagittal planes were acquired.    Comparison made with CT abdomen and pelvis December 7, 2021    FINDINGS:    LOWER CHEST: Bibasilar subsegmental atelectasis.    HEPATOBILIARY: Unremarkable.    SPLEEN: Unremarkable.    PANCREAS: Unremarkable.    ADRENAL GLANDS: Unremarkable.    KIDNEYS: No hydronephrosis. Bilateral subcentimeter renal hypodensities,   too small to characterize. Punctate nonobstructing right renal calculus.    ABDOMINOPELVIC NODES: Unremarkable.    PELVIC ORGANS: Unremarkable.    PERITONEUM/MESENTERY/BOWEL: Approximate 35 cm long segment of   terminal/distal ileum withcircumferential wall thickening, mucosal   hyperenhancement, surrounding inflammatory change, similar in length to   prior study. Two new probable abscesses contiguous with ileum, measuring   up to 1.5 x 1 cm and 1.8 x 1.4 cm (series 4, image 246; and series 601,   image 67, series 4, image 236). No bowel obstruction. No gross   pneumoperitoneum. Colonic diverticulosis. No bowel obstruction.    BONES/SOFT TISSUES: Unremarkable.    OTHER: Vascular calcifications.      IMPRESSION:  Since December 7, 2021:    Persistent acute terminal/distal ileitis involving 35 cm segment of   bowel. Two new probable abscesses contiguous with ileum, measuring up to   1.5 x 1 cm and 1.8 x 1.4 cm. Correlate for history of Crohn's disease.    --- End of Report ---            DEEPAK DAWN MD; Attending Radiologist  This document has been electronically signed. Jan 8 2022  3:47PM (01-08-22 @ 14:43)      CARDIOLOGY TESTING      MEDICATIONS  ATENolol  Tablet 25 Oral daily  cefepime   IVPB 2000 IV Intermittent every 12 hours  dextrose 5% + sodium chloride 0.45%. 1000 IV Continuous <Continuous>  dicyclomine 10 Oral once  heparin   Injectable 5000 SubCutaneous every 12 hours  hydrOXYzine hydrochloride 50 Oral at bedtime  lisinopril 40 Oral daily  metroNIDAZOLE  IVPB 500 IV Intermittent every 8 hours  pantoprazole    Tablet 40 Oral before breakfast      Weight  Weight (kg): 58.2 (01-09-22 @ 21:40)    ANTIBIOTICS:  cefepime   IVPB 2000 milliGRAM(s) IV Intermittent every 12 hours  metroNIDAZOLE  IVPB 500 milliGRAM(s) IV Intermittent every 8 hours      ALLERGIES:  No Known Allergies    
  Name: GARDENIA HATHAWAY  Age: 66y  Gender: Female    Pt was seen and examined.   c/o:  doing better she says although still with mild RLQ tenderness when she walks.    Allergies:  No Known Allergies      PHYSICAL EXAM:    Vitals:  T(C): 37.2 (01-10-22 @ 20:35), Max: 37.2 (01-10-22 @ 20:35)  HR: 73 (01-10-22 @ 20:35) (67 - 73)  BP: 166/89 (01-10-22 @ 20:35) (154/83 - 166/89)  RR: 16 (01-10-22 @ 20:35) (16 - 18)  SpO2: --  Wt(kg): --Vital Signs Last 24 Hrs  T(C): 37.2 (10 Reinaldo 2022 20:35), Max: 37.2 (10 Reinadlo 2022 20:35)  T(F): 98.9 (10 Reinaldo 2022 20:35), Max: 98.9 (10 Reinaldo 2022 20:35)  HR: 73 (10 Reinaldo 2022 20:35) (67 - 73)  BP: 166/89 (10 Reinaldo 2022 20:35) (154/83 - 166/89)  BP(mean): --  RR: 16 (10 Reinaldo 2022 20:35) (16 - 18)  SpO2: --      NECK: Supple, No JVD  CHEST/LUNG: CTA, B/L, No rales, rhonchi, wheezing, or rubs  HEART: S1,S2, N1 Regular rate and rhythm; No murmurs, rubs, or gallops  ABDOMEN: Soft, pos RLQ tend  Bowel sounds present  EXTREMITIES:  2+ Peripheral Pulses, No clubbing, cyanosis, or edema      LABS:                        12.7   9.43  )-----------( 330      ( 09 Jan 2022 06:01 )             38.0     01-09    135  |  101  |  7<L>  ----------------------------<  96  4.2   |  24  |  0.6<L>    Ca    8.7      09 Jan 2022 06:01    TPro  5.6<L>  /  Alb  3.1<L>  /  TBili  0.3  /  DBili  x   /  AST  7   /  ALT  <5  /  AlkPhos  77  01-09    LIVER FUNCTIONS - ( 09 Jan 2022 06:01 )  Alb: 3.1 g/dL / Pro: 5.6 g/dL / ALK PHOS: 77 U/L / ALT: <5 U/L / AST: 7 U/L / GGT: x                 MEDICATIONS  (STANDING):  ATENolol  Tablet 25 milliGRAM(s) Oral daily  cefepime   IVPB 2000 milliGRAM(s) IV Intermittent every 12 hours  dextrose 5% + sodium chloride 0.45%. 1000 milliLiter(s) (75 mL/Hr) IV Continuous <Continuous>  dicyclomine 10 milliGRAM(s) Oral once  heparin   Injectable 5000 Unit(s) SubCutaneous every 12 hours  hydrOXYzine hydrochloride 50 milliGRAM(s) Oral at bedtime  lisinopril 40 milliGRAM(s) Oral daily  metroNIDAZOLE  IVPB 500 milliGRAM(s) IV Intermittent every 8 hours  pantoprazole    Tablet 40 milliGRAM(s) Oral before breakfast        RADIOLOGY & ADDITIONAL TESTS:    Imaging Personally Reviewed:  [ ] YES  [ ] NO    A/P:  1.  Terminal ileitis              appreciate ID consult. Agree that IV home abx best option given pt cannot tolerate PO Flagyl            appreciate GI and Surg f/u           IV abx, clears as per GI            PLAN -   midline, IV abx Ertapenem for 3 weeks and then reassess. 
66yFemale  Being followed for ileitis   Interval history: Patient denies nausea, vomiting, hematemesis, melena, blood in stool, diarrhea, constipation. +RLQ pain.       PAST MEDICAL & SURGICAL HISTORY:   Migraine    HTN (hypertension)    Diverticulitis    Ileitis    Terminal ileitis            Social History: No smoking. No alcohol. No illegal drug use.          MEDICATIONS:  MEDICATIONS  (STANDING):  ATENolol  Tablet 25 milliGRAM(s) Oral daily  cefepime   IVPB 2000 milliGRAM(s) IV Intermittent every 12 hours  dextrose 5% + sodium chloride 0.45%. 1000 milliLiter(s) (75 mL/Hr) IV Continuous <Continuous>  dicyclomine 10 milliGRAM(s) Oral once  heparin   Injectable 5000 Unit(s) SubCutaneous every 12 hours  hydrOXYzine hydrochloride 50 milliGRAM(s) Oral at bedtime  lisinopril 40 milliGRAM(s) Oral daily  metroNIDAZOLE  IVPB 500 milliGRAM(s) IV Intermittent every 8 hours  pantoprazole    Tablet 40 milliGRAM(s) Oral before breakfast    MEDICATIONS  (PRN):  morphine  - Injectable 1 milliGRAM(s) IV Push every 6 hours PRN Severe Pain (7 - 10)      Allergies:  No Known Allergies          REVIEW OF SYSTEMS:  General:  No weight loss, fevers, or chills.  Eyes:  No reported pain or visual changes  ENT:  No sore throat or runny nose.  NECK: No stiffness   CV:  No chest pain or palpitations.  Resp:  No shortness of breath, cough  GI:  +RLQ abdominal pain, No nausea, vomiting, dysphagia, diarrhea or constipation. No rectal bleeding, melena, or hematemesis.  Muscle:  No aches or weakness  Neuro:  No tingling, numbness           VITAL SIGNS:   T(F): 97.5 (01-10-22 @ 04:38), Max: 98.8 (01-09-22 @ 15:47)  HR: 69 (01-10-22 @ 04:38) (68 - 74)  BP: 154/83 (01-10-22 @ 04:38) (134/76 - 164/83)  RR: 18 (01-10-22 @ 04:38) (18 - 18)  SpO2: 96% (01-09-22 @ 18:32) (96% - 97%)    PHYSICAL EXAM:  GENERAL: AAOx3, no acute distress.  HEAD:  Atraumatic, Normocephalic  EYES: conjunctiva and sclera clear  NECK: Supple, no JVD or thyromegaly  CHEST/LUNG: Clear to auscultation bilaterally; No wheeze, rhonchi, or rales  HEART: Regular rate and rhythm; normal S1, S2, No murmurs.  ABDOMEN: Soft, +RLQ tenderness, nondistended; Bowel sounds present  NEUROLOGY: No asterixis or tremor.   SKIN: Intact, no jaundice            LABS:                        12.7   9.43  )-----------( 330      ( 09 Jan 2022 06:01 )             38.0     01-09    135  |  101  |  7<L>  ----------------------------<  96  4.2   |  24  |  0.6<L>    Ca    8.7      09 Jan 2022 06:01    TPro  5.6<L>  /  Alb  3.1<L>  /  TBili  0.3  /  DBili  x   /  AST  7   /  ALT  <5  /  AlkPhos  77  01-09    LIVER FUNCTIONS - ( 09 Jan 2022 06:01 )  Alb: 3.1 g/dL / Pro: 5.6 g/dL / ALK PHOS: 77 U/L / ALT: <5 U/L / AST: 7 U/L / GGT: x               IMAGING:    < from: CT Abdomen and Pelvis w/ IV Cont (01.08.22 @ 14:43) >    ACC: [id number] EXAM:  CT ABDOMEN AND PELVIS IC                          PROCEDURE DATE:  01/08/2022          INTERPRETATION:  CLINICAL STATEMENT: Abdominal pain.    TECHNIQUE: Contiguous axial CT images were obtained from the lower chest   to the pubic symphysis following administration of 100cc Optiray 320   intravenous contrast.  Oral contrast was not administered.  Reformatted   images in the coronal and sagittal planes were acquired.    Comparison made with CT abdomen and pelvis December 7, 2021    FINDINGS:    LOWER CHEST: Bibasilar subsegmental atelectasis.    HEPATOBILIARY: Unremarkable.    SPLEEN: Unremarkable.    PANCREAS: Unremarkable.    ADRENAL GLANDS: Unremarkable.    KIDNEYS: No hydronephrosis. Bilateral subcentimeter renal hypodensities,   too small to characterize. Punctate nonobstructing right renal calculus.    ABDOMINOPELVIC NODES: Unremarkable.    PELVIC ORGANS: Unremarkable.    PERITONEUM/MESENTERY/BOWEL: Approximate 35 cm long segment of   terminal/distal ileum withcircumferential wall thickening, mucosal   hyperenhancement, surrounding inflammatory change, similar in length to   prior study. Two new probable abscesses contiguous with ileum, measuring   up to 1.5 x 1 cm and 1.8 x 1.4 cm (series 4, image 246; and series 601,   image 67, series 4, image 236). No bowel obstruction. No gross   pneumoperitoneum. Colonic diverticulosis. No bowel obstruction.    BONES/SOFT TISSUES: Unremarkable.    OTHER: Vascular calcifications.      IMPRESSION:  Since December 7, 2021:    Persistent acute terminal/distal ileitis involving 35 cm segment of   bowel. Two new probable abscesses contiguous with ileum, measuring up to   1.5 x 1 cm and 1.8 x 1.4 cm. Correlate for history of Crohn's disease.    --- End of Report ---            DEEPAK DAWN MD; Attending Radiologist  This document has been electronically signed. Jan 8 2022  3:47PM    < end of copied text >        
66yFemale  Being followed for ileitis with abscess   Interval history: Patient denies nausea, vomiting, hematemesis, melena, blood in stool, diarrhea, constipation, abdominal pain. Patient feeling 100% better now.      PAST MEDICAL & SURGICAL HISTORY:   Migraine    HTN (hypertension)    Diverticulitis    Ileitis    Terminal ileitis            Social History: No smoking. No alcohol. No illegal drug use.            MEDICATIONS  (STANDING):  ATENolol  Tablet 25 milliGRAM(s) Oral daily  dextrose 5% + sodium chloride 0.45%. 1000 milliLiter(s) (75 mL/Hr) IV Continuous <Continuous>  dicyclomine 10 milliGRAM(s) Oral once  ertapenem  IVPB 1000 milliGRAM(s) IV Intermittent once  heparin   Injectable 5000 Unit(s) SubCutaneous every 12 hours  hydrOXYzine hydrochloride 50 milliGRAM(s) Oral at bedtime  lisinopril 40 milliGRAM(s) Oral daily  pantoprazole    Tablet 40 milliGRAM(s) Oral before breakfast    MEDICATIONS  (PRN):  aluminum hydroxide/magnesium hydroxide/simethicone Suspension 30 milliLiter(s) Oral every 4 hours PRN Dyspepsia  HYDROmorphone  Injectable 1 milliGRAM(s) IV Push every 8 hours PRN Severe Pain (7 - 10)      Allergies:   No Known Allergies            REVIEW OF SYSTEMS:  General:  No weight loss, fevers, or chills.  Eyes:  No reported pain or visual changes  ENT:  No sore throat or runny nose.  NECK: No stiffness   CV:  No chest pain or palpitations.  Resp:  No shortness of breath, cough  GI:  No abdominal pain, nausea, vomiting, dysphagia, diarrhea or constipation. No rectal bleeding, melena, or hematemesis.  Muscle:  No aches or weakness  Neuro:  No tingling, numbness         VITAL SIGNS:   T(F): 97.6 (01-11-22 @ 04:03), Max: 98.9 (01-10-22 @ 20:35)  HR: 58 (01-11-22 @ 04:03) (58 - 73)  BP: 153/75 (01-11-22 @ 04:03) (153/75 - 166/89)  RR: 18 (01-11-22 @ 04:03) (16 - 18)  SpO2: --    PHYSICAL EXAM:  GENERAL: AAOx3, no acute distress.  HEAD:  Atraumatic, Normocephalic  EYES: conjunctiva and sclera clear  NECK: Supple, no JVD or thyromegaly  CHEST/LUNG: Clear to auscultation bilaterally; No wheeze, rhonchi, or rales  HEART: Regular rate and rhythm; normal S1, S2, No murmurs.  ABDOMEN: Soft, nontender, nondistended; Bowel sounds present  NEUROLOGY: No asterixis or tremor.   SKIN: Intact, no jaundice            LABS:                IMAGING:    < from: CT Abdomen and Pelvis w/ IV Cont (01.08.22 @ 14:43) >    ACC: [id number] EXAM:  CT ABDOMEN AND PELVIS IC                          PROCEDURE DATE:  01/08/2022          INTERPRETATION:  CLINICAL STATEMENT: Abdominal pain.    TECHNIQUE: Contiguous axial CT images were obtained from the lower chest   to the pubic symphysis following administration of 100cc Optiray 320   intravenous contrast.  Oral contrast was not administered.  Reformatted   images in the coronal and sagittal planes were acquired.    Comparison made with CT abdomen and pelvis December 7, 2021    FINDINGS:    LOWER CHEST: Bibasilar subsegmental atelectasis.    HEPATOBILIARY: Unremarkable.    SPLEEN: Unremarkable.    PANCREAS: Unremarkable.    ADRENAL GLANDS: Unremarkable.    KIDNEYS: No hydronephrosis. Bilateral subcentimeter renal hypodensities,   too small to characterize. Punctate nonobstructing right renal calculus.    ABDOMINOPELVIC NODES: Unremarkable.    PELVIC ORGANS: Unremarkable.    PERITONEUM/MESENTERY/BOWEL: Approximate 35 cm long segment of   terminal/distal ileum withcircumferential wall thickening, mucosal   hyperenhancement, surrounding inflammatory change, similar in length to   prior study. Two new probable abscesses contiguous with ileum, measuring   up to 1.5 x 1 cm and 1.8 x 1.4 cm (series 4, image 246; and series 601,   image 67, series 4, image 236). No bowel obstruction. No gross   pneumoperitoneum. Colonic diverticulosis. No bowel obstruction.    BONES/SOFT TISSUES: Unremarkable.    OTHER: Vascular calcifications.      IMPRESSION:  Since December 7, 2021:    Persistent acute terminal/distal ileitis involving 35 cm segment of   bowel. Two new probable abscesses contiguous with ileum, measuring up to   1.5 x 1 cm and 1.8 x 1.4 cm. Correlate for history of Crohn's disease.    --- End of Report ---            DEEPAK DAWN MD; Attending Radiologist  This document has been electronically signed. Jan 8 2022  3:47PM    < end of copied text >

## 2022-01-13 LAB
BAKER'S YEAST IGA QN IA: 6.9 UNITS — SIGNIFICANT CHANGE UP
BAKER'S YEAST IGA QN IA: NEGATIVE — SIGNIFICANT CHANGE UP
BAKER'S YEAST IGG QN IA: 21.3 UNITS — HIGH
BAKER'S YEAST IGG QN IA: ABNORMAL

## 2022-01-23 DIAGNOSIS — I10 ESSENTIAL (PRIMARY) HYPERTENSION: ICD-10-CM

## 2022-01-23 DIAGNOSIS — K50.014 CROHN'S DISEASE OF SMALL INTESTINE WITH ABSCESS: ICD-10-CM

## 2022-07-19 ENCOUNTER — OUTPATIENT (OUTPATIENT)
Dept: OUTPATIENT SERVICES | Facility: HOSPITAL | Age: 67
LOS: 1 days | Discharge: HOME | End: 2022-07-19

## 2022-07-19 PROBLEM — K52.9 NONINFECTIVE GASTROENTERITIS AND COLITIS, UNSPECIFIED: Chronic | Status: ACTIVE | Noted: 2022-01-08

## 2022-07-19 PROBLEM — K50.00 CROHN'S DISEASE OF SMALL INTESTINE WITHOUT COMPLICATIONS: Chronic | Status: ACTIVE | Noted: 2022-01-08

## 2022-08-15 NOTE — ED ADULT NURSE NOTE - PAIN: PRESENCE, MLM
The patient states his pain level is back up to the same number as when he first saw Dr. Chase. He is requesting to speak with Layla. He is aware Dr. Chase is out this week.     [phone]   complains of pain/discomfort

## 2023-03-29 NOTE — ED PROVIDER NOTE - CHPI ED SYMPTOMS NEG
no dysuria/no hematuria/no chills Bexarotene Counseling:  I discussed with the patient the risks of bexarotene including but not limited to hair loss, dry lips/skin/eyes, liver abnormalities, hyperlipidemia, pancreatitis, depression/suicidal ideation, photosensitivity, drug rash/allergic reactions, hypothyroidism, anemia, leukopenia, infection, cataracts, and teratogenicity.  Patient understands that they will need regular blood tests to check lipid profile, liver function tests, white blood cell count, thyroid function tests and pregnancy test if applicable.

## 2023-08-28 ENCOUNTER — INPATIENT (INPATIENT)
Facility: HOSPITAL | Age: 68
LOS: 2 days | Discharge: HOME | DRG: 386 | End: 2023-08-31
Payer: MEDICARE

## 2023-08-28 VITALS
TEMPERATURE: 99 F | WEIGHT: 145.06 LBS | RESPIRATION RATE: 20 BRPM | OXYGEN SATURATION: 99 % | HEART RATE: 80 BPM | SYSTOLIC BLOOD PRESSURE: 229 MMHG | DIASTOLIC BLOOD PRESSURE: 115 MMHG

## 2023-08-28 DIAGNOSIS — K50.90 CROHN'S DISEASE, UNSPECIFIED, WITHOUT COMPLICATIONS: ICD-10-CM

## 2023-08-28 DIAGNOSIS — Z90.49 ACQUIRED ABSENCE OF OTHER SPECIFIED PARTS OF DIGESTIVE TRACT: Chronic | ICD-10-CM

## 2023-08-28 LAB
ALBUMIN SERPL ELPH-MCNC: 4.7 G/DL — SIGNIFICANT CHANGE UP (ref 3.5–5.2)
ALP SERPL-CCNC: 81 U/L — SIGNIFICANT CHANGE UP (ref 30–115)
ALT FLD-CCNC: 12 U/L — SIGNIFICANT CHANGE UP (ref 0–41)
ANION GAP SERPL CALC-SCNC: 11 MMOL/L — SIGNIFICANT CHANGE UP (ref 7–14)
APPEARANCE UR: CLEAR — SIGNIFICANT CHANGE UP
AST SERPL-CCNC: 16 U/L — SIGNIFICANT CHANGE UP (ref 0–41)
BASOPHILS # BLD AUTO: 0.05 K/UL — SIGNIFICANT CHANGE UP (ref 0–0.2)
BASOPHILS NFR BLD AUTO: 0.5 % — SIGNIFICANT CHANGE UP (ref 0–1)
BILIRUB SERPL-MCNC: 0.4 MG/DL — SIGNIFICANT CHANGE UP (ref 0.2–1.2)
BILIRUB UR-MCNC: NEGATIVE — SIGNIFICANT CHANGE UP
BUN SERPL-MCNC: 9 MG/DL — LOW (ref 10–20)
CALCIUM SERPL-MCNC: 10.1 MG/DL — SIGNIFICANT CHANGE UP (ref 8.4–10.5)
CHLORIDE SERPL-SCNC: 100 MMOL/L — SIGNIFICANT CHANGE UP (ref 98–110)
CO2 SERPL-SCNC: 29 MMOL/L — SIGNIFICANT CHANGE UP (ref 17–32)
COLOR SPEC: YELLOW — SIGNIFICANT CHANGE UP
CREAT SERPL-MCNC: 0.8 MG/DL — SIGNIFICANT CHANGE UP (ref 0.7–1.5)
DIFF PNL FLD: NEGATIVE — SIGNIFICANT CHANGE UP
EGFR: 81 ML/MIN/1.73M2 — SIGNIFICANT CHANGE UP
EOSINOPHIL # BLD AUTO: 0.2 K/UL — SIGNIFICANT CHANGE UP (ref 0–0.7)
EOSINOPHIL NFR BLD AUTO: 2.2 % — SIGNIFICANT CHANGE UP (ref 0–8)
GLUCOSE SERPL-MCNC: 90 MG/DL — SIGNIFICANT CHANGE UP (ref 70–99)
GLUCOSE UR QL: NEGATIVE MG/DL — SIGNIFICANT CHANGE UP
HCT VFR BLD CALC: 49 % — HIGH (ref 37–47)
HGB BLD-MCNC: 16.4 G/DL — HIGH (ref 12–16)
IMM GRANULOCYTES NFR BLD AUTO: 0.3 % — SIGNIFICANT CHANGE UP (ref 0.1–0.3)
KETONES UR-MCNC: NEGATIVE MG/DL — SIGNIFICANT CHANGE UP
LACTATE BLDV-MCNC: 1.2 MMOL/L — SIGNIFICANT CHANGE UP (ref 0.5–2)
LEUKOCYTE ESTERASE UR-ACNC: NEGATIVE — SIGNIFICANT CHANGE UP
LIDOCAIN IGE QN: 57 U/L — SIGNIFICANT CHANGE UP (ref 7–60)
LYMPHOCYTES # BLD AUTO: 2.12 K/UL — SIGNIFICANT CHANGE UP (ref 1.2–3.4)
LYMPHOCYTES # BLD AUTO: 23 % — SIGNIFICANT CHANGE UP (ref 20.5–51.1)
MCHC RBC-ENTMCNC: 31 PG — SIGNIFICANT CHANGE UP (ref 27–31)
MCHC RBC-ENTMCNC: 33.5 G/DL — SIGNIFICANT CHANGE UP (ref 32–37)
MCV RBC AUTO: 92.6 FL — SIGNIFICANT CHANGE UP (ref 81–99)
MONOCYTES # BLD AUTO: 0.51 K/UL — SIGNIFICANT CHANGE UP (ref 0.1–0.6)
MONOCYTES NFR BLD AUTO: 5.5 % — SIGNIFICANT CHANGE UP (ref 1.7–9.3)
NEUTROPHILS # BLD AUTO: 6.3 K/UL — SIGNIFICANT CHANGE UP (ref 1.4–6.5)
NEUTROPHILS NFR BLD AUTO: 68.5 % — SIGNIFICANT CHANGE UP (ref 42.2–75.2)
NITRITE UR-MCNC: NEGATIVE — SIGNIFICANT CHANGE UP
NRBC # BLD: 0 /100 WBCS — SIGNIFICANT CHANGE UP (ref 0–0)
PH UR: 6.5 — SIGNIFICANT CHANGE UP (ref 5–8)
PLATELET # BLD AUTO: 286 K/UL — SIGNIFICANT CHANGE UP (ref 130–400)
PMV BLD: 10.1 FL — SIGNIFICANT CHANGE UP (ref 7.4–10.4)
POTASSIUM SERPL-MCNC: 3.9 MMOL/L — SIGNIFICANT CHANGE UP (ref 3.5–5)
POTASSIUM SERPL-SCNC: 3.9 MMOL/L — SIGNIFICANT CHANGE UP (ref 3.5–5)
PROT SERPL-MCNC: 7.6 G/DL — SIGNIFICANT CHANGE UP (ref 6–8)
PROT UR-MCNC: NEGATIVE MG/DL — SIGNIFICANT CHANGE UP
RBC # BLD: 5.29 M/UL — SIGNIFICANT CHANGE UP (ref 4.2–5.4)
RBC # FLD: 12.6 % — SIGNIFICANT CHANGE UP (ref 11.5–14.5)
SODIUM SERPL-SCNC: 140 MMOL/L — SIGNIFICANT CHANGE UP (ref 135–146)
SP GR SPEC: 1.01 — SIGNIFICANT CHANGE UP (ref 1–1.03)
UROBILINOGEN FLD QL: 0.2 MG/DL — SIGNIFICANT CHANGE UP (ref 0.2–1)
WBC # BLD: 9.21 K/UL — SIGNIFICANT CHANGE UP (ref 4.8–10.8)
WBC # FLD AUTO: 9.21 K/UL — SIGNIFICANT CHANGE UP (ref 4.8–10.8)

## 2023-08-28 PROCEDURE — 80053 COMPREHEN METABOLIC PANEL: CPT

## 2023-08-28 PROCEDURE — 80048 BASIC METABOLIC PNL TOTAL CA: CPT

## 2023-08-28 PROCEDURE — 87493 C DIFF AMPLIFIED PROBE: CPT

## 2023-08-28 PROCEDURE — 71045 X-RAY EXAM CHEST 1 VIEW: CPT | Mod: 26

## 2023-08-28 PROCEDURE — 86140 C-REACTIVE PROTEIN: CPT

## 2023-08-28 PROCEDURE — 74177 CT ABD & PELVIS W/CONTRAST: CPT | Mod: 26,MA

## 2023-08-28 PROCEDURE — 85027 COMPLETE CBC AUTOMATED: CPT

## 2023-08-28 PROCEDURE — 83605 ASSAY OF LACTIC ACID: CPT

## 2023-08-28 PROCEDURE — 83993 ASSAY FOR CALPROTECTIN FECAL: CPT

## 2023-08-28 PROCEDURE — 87507 IADNA-DNA/RNA PROBE TQ 12-25: CPT

## 2023-08-28 PROCEDURE — 36415 COLL VENOUS BLD VENIPUNCTURE: CPT

## 2023-08-28 PROCEDURE — 85652 RBC SED RATE AUTOMATED: CPT

## 2023-08-28 PROCEDURE — 74019 RADEX ABDOMEN 2 VIEWS: CPT

## 2023-08-28 PROCEDURE — 99285 EMERGENCY DEPT VISIT HI MDM: CPT | Mod: FS

## 2023-08-28 RX ORDER — BENAZEPRIL HYDROCHLORIDE 40 MG/1
1 TABLET ORAL
Qty: 0 | Refills: 0 | DISCHARGE

## 2023-08-28 RX ORDER — HYDRALAZINE HCL 50 MG
10 TABLET ORAL ONCE
Refills: 0 | Status: COMPLETED | OUTPATIENT
Start: 2023-08-28 | End: 2023-08-28

## 2023-08-28 RX ORDER — SODIUM CHLORIDE 9 MG/ML
1000 INJECTION INTRAMUSCULAR; INTRAVENOUS; SUBCUTANEOUS ONCE
Refills: 0 | Status: COMPLETED | OUTPATIENT
Start: 2023-08-28 | End: 2023-08-28

## 2023-08-28 RX ORDER — ONDANSETRON 8 MG/1
4 TABLET, FILM COATED ORAL EVERY 8 HOURS
Refills: 0 | Status: DISCONTINUED | OUTPATIENT
Start: 2023-08-28 | End: 2023-08-31

## 2023-08-28 RX ORDER — CIPROFLOXACIN LACTATE 400MG/40ML
400 VIAL (ML) INTRAVENOUS ONCE
Refills: 0 | Status: COMPLETED | OUTPATIENT
Start: 2023-08-28 | End: 2023-08-28

## 2023-08-28 RX ORDER — FAMOTIDINE 10 MG/ML
20 INJECTION INTRAVENOUS ONCE
Refills: 0 | Status: COMPLETED | OUTPATIENT
Start: 2023-08-28 | End: 2023-08-28

## 2023-08-28 RX ORDER — CIPROFLOXACIN LACTATE 400MG/40ML
400 VIAL (ML) INTRAVENOUS EVERY 12 HOURS
Refills: 0 | Status: DISCONTINUED | OUTPATIENT
Start: 2023-08-29 | End: 2023-08-31

## 2023-08-28 RX ORDER — ATENOLOL 25 MG/1
1 TABLET ORAL
Qty: 0 | Refills: 0 | DISCHARGE

## 2023-08-28 RX ORDER — ACETAMINOPHEN 500 MG
650 TABLET ORAL EVERY 6 HOURS
Refills: 0 | Status: DISCONTINUED | OUTPATIENT
Start: 2023-08-28 | End: 2023-08-31

## 2023-08-28 RX ORDER — LISINOPRIL 2.5 MG/1
1 TABLET ORAL
Refills: 0 | DISCHARGE

## 2023-08-28 RX ORDER — METRONIDAZOLE 500 MG
500 TABLET ORAL ONCE
Refills: 0 | Status: COMPLETED | OUTPATIENT
Start: 2023-08-28 | End: 2023-08-28

## 2023-08-28 RX ORDER — SODIUM CHLORIDE 9 MG/ML
1000 INJECTION INTRAMUSCULAR; INTRAVENOUS; SUBCUTANEOUS
Refills: 0 | Status: DISCONTINUED | OUTPATIENT
Start: 2023-08-28 | End: 2023-08-31

## 2023-08-28 RX ORDER — LANOLIN ALCOHOL/MO/W.PET/CERES
3 CREAM (GRAM) TOPICAL AT BEDTIME
Refills: 0 | Status: DISCONTINUED | OUTPATIENT
Start: 2023-08-28 | End: 2023-08-31

## 2023-08-28 RX ORDER — KETOROLAC TROMETHAMINE 30 MG/ML
15 SYRINGE (ML) INJECTION ONCE
Refills: 0 | Status: DISCONTINUED | OUTPATIENT
Start: 2023-08-28 | End: 2023-08-28

## 2023-08-28 RX ORDER — CIPROFLOXACIN LACTATE 400MG/40ML
VIAL (ML) INTRAVENOUS
Refills: 0 | Status: DISCONTINUED | OUTPATIENT
Start: 2023-08-28 | End: 2023-08-31

## 2023-08-28 RX ORDER — SODIUM CHLORIDE 9 MG/ML
1000 INJECTION INTRAMUSCULAR; INTRAVENOUS; SUBCUTANEOUS
Refills: 0 | Status: DISCONTINUED | OUTPATIENT
Start: 2023-08-28 | End: 2023-08-28

## 2023-08-28 RX ORDER — METRONIDAZOLE 500 MG
500 TABLET ORAL EVERY 8 HOURS
Refills: 0 | Status: DISCONTINUED | OUTPATIENT
Start: 2023-08-29 | End: 2023-08-31

## 2023-08-28 RX ORDER — METOCLOPRAMIDE HCL 10 MG
10 TABLET ORAL ONCE
Refills: 0 | Status: COMPLETED | OUTPATIENT
Start: 2023-08-28 | End: 2023-08-28

## 2023-08-28 RX ORDER — METRONIDAZOLE 500 MG
TABLET ORAL
Refills: 0 | Status: DISCONTINUED | OUTPATIENT
Start: 2023-08-28 | End: 2023-08-31

## 2023-08-28 RX ORDER — DIATRIZOATE MEGLUMINE 180 MG/ML
30 INJECTION, SOLUTION INTRAVESICAL ONCE
Refills: 0 | Status: COMPLETED | OUTPATIENT
Start: 2023-08-28 | End: 2023-08-28

## 2023-08-28 RX ADMIN — Medication 10 MILLIGRAM(S): at 22:43

## 2023-08-28 RX ADMIN — DIATRIZOATE MEGLUMINE 30 MILLILITER(S): 180 INJECTION, SOLUTION INTRAVESICAL at 17:05

## 2023-08-28 RX ADMIN — FAMOTIDINE 20 MILLIGRAM(S): 10 INJECTION INTRAVENOUS at 17:03

## 2023-08-28 RX ADMIN — SODIUM CHLORIDE 1000 MILLILITER(S): 9 INJECTION INTRAMUSCULAR; INTRAVENOUS; SUBCUTANEOUS at 17:03

## 2023-08-28 RX ADMIN — Medication 104 MILLIGRAM(S): at 17:04

## 2023-08-28 RX ADMIN — Medication 15 MILLIGRAM(S): at 17:04

## 2023-08-28 RX ADMIN — Medication 15 MILLIGRAM(S): at 23:08

## 2023-08-28 RX ADMIN — Medication 10 MILLIGRAM(S): at 23:43

## 2023-08-28 RX ADMIN — Medication 100 MILLIGRAM(S): at 22:33

## 2023-08-28 RX ADMIN — Medication 200 MILLIGRAM(S): at 22:33

## 2023-08-28 NOTE — H&P ADULT - NSICDXPASTMEDICALHX_GEN_ALL_CORE_FT
PAST MEDICAL HISTORY:  Crohn's disease     Diverticulitis     HTN (hypertension)     Ileitis     Migraine     SBO (small bowel obstruction)     Terminal ileitis

## 2023-08-28 NOTE — ED PROVIDER NOTE - OBJECTIVE STATEMENT
68 year old female with pmhx of crohns and diverticulitis, presents to ed with abdominal pain and diarrhea x 2 days. pain to diffuse abdomen. Mild nausea. no vomiting, fever, chills, chest pain, sob, or urinary symptoms.

## 2023-08-28 NOTE — H&P ADULT - HISTORY OF PRESENT ILLNESS
68 year old female with pmhx of crohns and diverticulitis, presents to ed with abdominal pain and diarrhea x 2 days. pain to diffuse abdomen. Mild nausea. no vomiting, fever, chills, chest pain, sob, or urinary sy 68 year old female presents to ed with abdominal pain and diarrhea x 2 days. The pain is diffuse to abdomen central to just right of the midline. She has a pmhx of Crohns and diverticulitis which she reports her last flare up of Crohn's was in July and was given ciprofloxacin and flagyl w/ relief, There is mild  nausea, negative vomiting, fever, chills, chest pain, sob, or urinary sys. The patient reports she passed watery stool (diarrhea) today. Her surgical history is significant for a bowel resection for her SBO.

## 2023-08-28 NOTE — H&P ADULT - NSHPLABSRESULTS_GEN_ALL_CORE
CBC:            16.4   9.21  )-----------( 286      ( 08-28-23 @ 17:35 )             49.0         Chem:         ( 08-28-23 @ 17:35 )    140  |  100  |  9<L>  ----------------------------<  90  3.9   |  29  |  0.8        Liver Functions: ( 08-28-23 @ 17:35 )  Alb: 4.7 g/dL / Pro: 7.6 g/dL / ALK PHOS: 81 U/L / ALT: 12 U/L / AST: 16 U/L / GGT: x              Type & Screen:

## 2023-08-28 NOTE — ED PROVIDER NOTE - ATTENDING APP SHARED VISIT CONTRIBUTION OF CARE
68 y.o. female, PMH of Crohn's dz, diverticulitis, bowel resection comes in c/o abdominal pain and diarrhea x 2 days. Pain is diffuse, crampy. (+) mild nausea, no vomiting. No fever/chills, CP/SOB, or urinary symptoms. On exam, pt in NAD, AAOx3, head NC/AT, CN II-XII intact, PEERL, EOMi, neck (-) midline tenderness, lungs CTA B/L, CV S1S2 regular, abdomen soft/(+)diffuse abdominal discomfort, mostly over right side of the abdomen/ND/(+)BS, ext (-) edema, motor 5/5x4, sensation intact, ambulating with steady gait. Will do labs, CT, UA and reevaluate.

## 2023-08-28 NOTE — ED PROVIDER NOTE - PROGRESS NOTE DETAILS
D/w Surgical PA and attending Dr. Chanel - feel this is more crohnz flare up admit for antibiotics and GI to medicine and will see on consult

## 2023-08-28 NOTE — ED ADULT NURSE NOTE - NSFALLUNIVINTERV_ED_ALL_ED
Bed/Stretcher in lowest position, wheels locked, appropriate side rails in place/Call bell, personal items and telephone in reach/Instruct patient to call for assistance before getting out of bed/chair/stretcher/Non-slip footwear applied when patient is off stretcher/[place] to call system/Physically safe environment - no spills, clutter or unnecessary equipment/Purposeful proactive rounding/Room/bathroom lighting operational, light cord in reach

## 2023-08-28 NOTE — PATIENT PROFILE ADULT - FALL HARM RISK - UNIVERSAL INTERVENTIONS
Bed in lowest position, wheels locked, appropriate side rails in place/Call bell, personal items and telephone in reach/Instruct patient to call for assistance before getting out of bed or chair/Non-slip footwear when patient is out of bed/[place] to call system/Physically safe environment - no spills, clutter or unnecessary equipment/Purposeful Proactive Rounding/Room/bathroom lighting operational, light cord in reach

## 2023-08-28 NOTE — CONSULT NOTE ADULT - ASSESSMENT
.  Impression:     Patient is a 69 y/o female with medical history of HTN, Everday smoker, Migraine HA's, and Crohn's disease who presents in the ER with 3 day history of Right sided abdominal pains that became progressively worse last night and reports when did not improve today she came to the ER for evaluation.  Patient with history of Crohn's and diverticulitis with a small bowel resection due to abdominal abscess at [place] in 2009.   Surgery consulted for CT scan findings of Status post distal small bowel resection. Fecalization of small bowel enteric contents with mildly dilated distal small bowel. Transition point just proximal to the small to large bowel anastomosis. Findings can be seen with early obstruction, possibly secondary to inflammatory changes/stricturing of the small bowel.  # Early PSBO vs Crohn's exacerbation.           Plan:  # Early PSBO vs Crohn's exacerbation.   - NPO except medications.  - IV hydration.  - Pain medication PRN.  Try to limit Opiate pain medications if possible.   - Serial abdominal exams.  - GI consult for possible Crohn's exacerbation.  - DVT/GI prophylaxis.  - Monitor daily labs.   - Encourage OOB to chair & ambulation with assistance.   - Case d/w Dr. Chanel and recommends to admit to medical service for management of Crohn's exacerbation and he will follow.     .  Impression:     Patient is a 67 y/o female with medical history of HTN, Everday smoker, Migraine HA's, and Crohn's disease who presents in the ER with 3 day history of Right sided abdominal pains that became progressively worse last night and reports when did not improve today she came to the ER for evaluation.  Patient with history of Crohn's and diverticulitis with a small bowel resection due to abdominal abscess at [place] in 2009.   Surgery consulted for CT scan findings of Status post distal small bowel resection. Fecalization of small bowel enteric contents with mildly dilated distal small bowel. Transition point just proximal to the small to large bowel anastomosis. Findings can be seen with early obstruction, possibly secondary to inflammatory changes/stricturing of the small bowel.  # Early PSBO,  Crohn's exacerbation.           Plan:  # Early PSBO,  Crohn's exacerbation.   - NPO except medications.  - IV hydration.  - Pain medication PRN.  Try to limit Opiate pain medications if possible.   - Serial abdominal exams.  - GI consult for possible Crohn's exacerbation.  - DVT/GI prophylaxis.  - Monitor daily labs.   - Encourage OOB to chair & ambulation with assistance.   - Case d/w Dr. Chanel and recommends to admit to medical service for management of possible Crohn's exacerbation and he will follow.

## 2023-08-28 NOTE — H&P ADULT - NSHPPHYSICALEXAM_GEN_ALL_CORE
Vital Signs Last 24 Hrs  T(C): 36.5 (28 Aug 2023 20:02), Max: 37.3 (28 Aug 2023 16:28)  T(F): 97.7 (28 Aug 2023 20:02), Max: 99.2 (28 Aug 2023 16:28)  HR: 72 (28 Aug 2023 20:02) (72 - 80)  BP: 199/108 (28 Aug 2023 20:02) (199/108 - 229/115)  BP(mean): --  RR: 20 (28 Aug 2023 16:28) (20 - 20)  SpO2: 99% (28 Aug 2023 16:28) (99% - 99%)    Parameters below as of 28 Aug 2023 16:28  Patient On (Oxygen Delivery Method): room air

## 2023-08-28 NOTE — CONSULT NOTE ADULT - CONSULT REASON
Abdominal pain, Possible Crohn's exacerbation vs early partial SBO Abdominal pain, Possible Crohn's exacerbation, early partial SBO

## 2023-08-28 NOTE — H&P ADULT - ASSESSMENT
68 year old female presents to ed with abdominal pain and diarrhea x 2 days. The pain is diffuse to abdomen central to just right of the midline. She has a pmhx of Crohns and diverticulitis which she reports her last flare up of Crohn's was in July and was given ciprofloxacin and flagyl w/ relief, There is mild  nausea, negative vomiting, fever, chills, chest pain, sob, or urinary sys. The patient reports she passed watery stool (diarrhea) today. Her surgical history is significant for a bowel resection for her SBO.      #IV fluids 100ml/hr  #Crohn's flare up vs. SBO  #NPO  #Abx Cipro and flagyl    #GI consult    #f/u AM CBC, CMP, lactic acid    # Give prophylaxis GI/VTE

## 2023-08-28 NOTE — ED PROVIDER NOTE - CLINICAL SUMMARY MEDICAL DECISION MAKING FREE TEXT BOX
68 y.o. female, PMH of Crohn's dz, diverticulitis, bowel resection comes in c/o abdominal pain and diarrhea x 2 days. Pain is diffuse, crampy. (+) mild nausea, no vomiting. No fever/chills, CP/SOB, or urinary symptoms. On exam, pt in NAD, AAOx3, head NC/AT, CN II-XII intact, PEERL, EOMi, neck (-) midline tenderness, lungs CTA B/L, CV S1S2 regular, abdomen soft/(+)diffuse abdominal discomfort, mostly over right side of the abdomen/ND/(+)BS, ext (-) edema, motor 5/5x4, sensation intact, ambulating with steady gait. Labs, CT, UA done and reviewed. CT (+) for possibly early obstruction. Evaluated by sx. Will admit for further care.

## 2023-08-28 NOTE — CONSULT NOTE ADULT - SUBJECTIVE AND OBJECTIVE BOX
.  Patient is a 67 y/o female with medical history of HTN, Everday smoker, Migraine HA's, and Crohn's disease who presents in the ER with 3 day history of Right sided abdominal pains that became progressively worse last night and reports when did not improve today she came to the ER for evaluation.  Patient with history of Crohn's and diverticulitis with a small bowel resection due to abdominal abscess at [place] in .    Patient reports she is followed by Dr. Rodriguez and he wanted to start medications for her Crohn's but patient wanted to wait before starting any medications.    Patient reports the right sided abdominal pain is moderate and crampy in nature and does not radiate and has no aggravating or relieving factors.  Patient reports associated nausea, no vomiting, + diarrhea, dyspepsia and HA.   Patient denies fever, chills, hematemesis, hematochezia, melena, urinary or GYN complaints, CP or SOB.    Patient reports she had a similar episode in July and Dr. Rodriguez thought it could be Crohn's vs diverticulitis and gave her Cipro & Flagyl and symptoms resolved.              PAST MEDICAL & SURGICAL HISTORY:  Migraine    HTN (hypertension)    Diverticulitis    Ileitis    Terminal ileitis    Crohn's disease    SBO (small bowel obstruction)    History of small bowel resection with appendectomy at [place]        Home Medications:  Atenolol 25 mg oral tablet: 1 tab(s) orally once a day (28 Aug 2023 22:17)  Lisinopril 40 mg oral tablet: 1 orally 2 times a day (28 Aug 2023 22:17)  Bentyl 20 mg as needed        MEDICATIONS  (STANDING):  ciprofloxacin   IVPB      ketorolac   Injectable 15 milliGRAM(s) IV Push once  metroNIDAZOLE  IVPB      sodium chloride 0.9%. 1000 milliLiter(s) (100 mL/Hr) IV Continuous <Continuous>    MEDICATIONS  (PRN):  acetaminophen     Tablet .. 650 milliGRAM(s) Oral every 6 hours PRN Temp greater or equal to 38C (100.4F), Mild Pain (1 - 3)  aluminum hydroxide/magnesium hydroxide/simethicone Suspension 30 milliLiter(s) Oral every 4 hours PRN Dyspepsia  melatonin 3 milliGRAM(s) Oral at bedtime PRN Insomnia  ondansetron Injectable 4 milliGRAM(s) IV Push every 8 hours PRN Nausea and/or Vomiting        Allergies  Morphine (Unknown)        SOCIAL HISTORY:  Tobacco use:  1/2 PPD x 50 years.   Alcohol use:  Socially  Drug use:  Denies        FAMILY HISTORY:  Mother:  Colitis  Father:  Lung ca, HTN, CAD        REVIEW OF SYSTEMS:  CONSTITUTIONAL:  + HA's.  No weakness, fevers or chills  EYES/ENT:  No visual changes;  No vertigo or throat pain   NECK:  No pain or stiffness  RESPIRATORY:  No cough, wheezing, hemoptysis; No shortness of breath  CARDIOVASCULAR:  No chest pain or palpitations  GASTROINTESTINAL:  + Rt sided abdominal pain.  + Nausea,  No Vomiting, + Diarrhea.  No hematemesis; No constipation. No melena or hematochezia.  GENITOURINARY:  No dysuria, frequency or hematuria  MUSCULOSKELETAL:  FROM all extremities, normal strength, No calf tenderness  NEUROLOGICAL:  No numbness or weakness  SKIN:  No itching, rashes        Vital Signs Last 24 Hrs  T(C): 36.5 (28 Aug 2023 20:02), Max: 37.3 (28 Aug 2023 16:28)  T(F): 97.7 (28 Aug 2023 20:02), Max: 99.2 (28 Aug 2023 16:28)  HR: 72 (28 Aug 2023 20:02) (72 - 80)  BP: 199/108 (28 Aug 2023 20:02) (199/108 - 229/115)  RR: 20 (28 Aug 2023 16:28) (20 - 20)  SpO2: 99% (28 Aug 2023 16:28) (99% - 99%)    Parameters below as of 28 Aug 2023 16:28  Patient On (Oxygen Delivery Method): room air        Physical Exam:  General:  WD, WN, female conversant in NAD.   Skin:  Warm dry with good color and turgor.  No rash or ulcers.   HEENT:   NC/AT, EOMI, normal hearing, no oral lesions, no LAD, neck supple.  Pulmonary:   CTA B/L, Normal respiratory effort. No W/R/R.   Cardiovascular:   S1 & S2, RRR, No murmurs, rubs or gallops.  Abdominal:  + BS, soft, No distention,  + Right sided abdominal tenderness.  No rebound, guarding or peritoneal signs.  Genitourinary:  No suprapubic tenderness, No CVAT.   Extremities:   Normal strength, no clubbing/cyanosis/edema.  Palpable distal pulses.   Neuro:  Awake, alert & oriented x 3,  CNs II-XII grossly intact, normal sensation, no focal deficits.        LABS:                        16.4   9.21  )-----------( 286      ( 28 Aug 2023 17:35 )             49.0         140  |  100  |  9<L>  ----------------------------<  90  3.9   |  29  |  0.8    Ca    10.1      28 Aug 2023 17:35    TPro  7.6  /  Alb  4.7  /  TBili  0.4  /  DBili  x   /  AST  16  /  ALT  12  /  AlkPhos  81        Urinalysis Basic - ( 28 Aug 2023 17:35 )    Color: Yellow / Appearance: Clear / S.014 / pH: x  Gluc: 90 mg/dL / Ketone: Negative mg/dL  / Bili: Negative / Urobili: 0.2 mg/dL   Blood: x / Protein: Negative mg/dL / Nitrite: Negative   Leuk Esterase: Negative / RBC: x / WBC x   Sq Epi: x / Non Sq Epi: x / Bacteria: x          CT Abdomen and Pelvis w/ Oral Cont and w/ IV Cont (23 @ 19:22)   ACC: [id number] EXAM:  CT ABDOMEN AND PELVIS OC IC   ORDERED BY: DEBI KYLE     PROCEDURE DATE:  2023        INTERPRETATION:  CLINICAL HISTORY / REASON FOR EXAM: Abdominal pain;   history of Crohn's.    TECHNIQUE: Contiguous axial CT images were obtained from the lower chest   to the pubic symphysis following administration of 95 mL Omnipaque 350   intravenous contrast, 5 mL discarded. Oral contrast was administered.   Reformatted images in the coronal and sagittal planes were acquired.    COMPARISON CT: CT abdomen and pelvis from 2022    OTHER STUDIES USED FOR CORRELATION: None.      FINDINGS:    LOWER CHEST: Bibasilar atelectasis/scarring.    HEPATOBILIARY: Unremarkable.    SPLEEN: Unremarkable.    PANCREAS: Unremarkable.    ADRENAL GLANDS: Unremarkable.    KIDNEYS: Symmetric enhancement bilaterally. No evidence of   hydronephrosis. Right renal hypodensities, including characterize.    ABDOMINOPELVIC NODES: Unremarkable.    PELVIC ORGANS: Unremarkable.    PERITONEUM/MESENTERY/BOWEL: Status post distal small bowel resection with   jejunal-colonic anastomosis. Transition point proximal to the small to   large bowel anastomosis (coronal series 601, image 41). Oral contrast   reaches the distal small bowel, fecalization of enteric contents.   Postsurgical changes of the sigmoid colon. No intraperitoneal free air.    BONES/SOFT TISSUES: No acute osseous abnormality. Postsurgical changes of   the anterior abdominal wall.    VASCULAR: Aorta is normal in caliber.      IMPRESSION:    Status post distal small bowel resection. Fecalization of small bowel   enteric contents with mildly dilated distal small bowel. Transition point   just proximal to the small to large bowel anastomosis. Findings can be   seen with early obstruction, possibly secondary to inflammatory   changes/stricturing of the small bowel.      < end of copied text >           .  Patient is a 67 y/o female with medical history of HTN, Everday smoker, Migraine HA's, and Crohn's disease who presents in the ER with 3 day history of Right sided abdominal pains that became progressively worse last night and reports when did not improve today she came to the ER for evaluation.  Patient with history of Crohn's and diverticulitis with a small bowel resection due to abdominal abscess at [place] in .    Patient reports she is followed by Dr. Rodriguez and he wanted to start medications for her Crohn's but patient wanted to wait before starting any medications.    Patient reports the right sided abdominal pain is moderate and crampy in nature and does not radiate and has no aggravating or relieving factors.  Patient reports associated nausea, no vomiting, + diarrhea, dyspepsia and HA.   Patient denies fever, chills, hematemesis, hematochezia, melena, urinary or GYN complaints, CP or SOB.    Patient reports she had a similar episode in July and Dr. Rodriguez thought it could be Crohn's or diverticulitis and gave her Cipro & Flagyl and symptoms resolved.              PAST MEDICAL & SURGICAL HISTORY:  Migraine    HTN (hypertension)    Diverticulitis    Ileitis    Terminal ileitis    Crohn's disease    SBO (small bowel obstruction)    History of small bowel resection with appendectomy at [facility]        Home Medications:  Atenolol 25 mg oral tablet: 1 tab(s) orally once a day (28 Aug 2023 22:17)  Lisinopril 40 mg oral tablet: 1 orally 2 times a day (28 Aug 2023 22:17)  Bentyl 20 mg as needed        MEDICATIONS  (STANDING):  ciprofloxacin   IVPB      ketorolac   Injectable 15 milliGRAM(s) IV Push once  metroNIDAZOLE  IVPB      sodium chloride 0.9%. 1000 milliLiter(s) (100 mL/Hr) IV Continuous <Continuous>    MEDICATIONS  (PRN):  acetaminophen     Tablet .. 650 milliGRAM(s) Oral every 6 hours PRN Temp greater or equal to 38C (100.4F), Mild Pain (1 - 3)  aluminum hydroxide/magnesium hydroxide/simethicone Suspension 30 milliLiter(s) Oral every 4 hours PRN Dyspepsia  melatonin 3 milliGRAM(s) Oral at bedtime PRN Insomnia  ondansetron Injectable 4 milliGRAM(s) IV Push every 8 hours PRN Nausea and/or Vomiting        Allergies  Morphine (Unknown)        SOCIAL HISTORY:  Tobacco use:  1/2 PPD x 50 years.   Alcohol use:  Socially  Drug use:  Denies        FAMILY HISTORY:  Mother:  Colitis  Father:  Lung ca, HTN, CAD        REVIEW OF SYSTEMS:  CONSTITUTIONAL:  + HA's.  No weakness, fevers or chills  EYES/ENT:  No visual changes;  No vertigo or throat pain   NECK:  No pain or stiffness  RESPIRATORY:  No cough, wheezing, hemoptysis; No shortness of breath  CARDIOVASCULAR:  No chest pain or palpitations  GASTROINTESTINAL:  + Rt sided abdominal pain.  + Nausea,  No Vomiting, + Diarrhea.  No hematemesis; No constipation. No melena or hematochezia.  GENITOURINARY:  No dysuria, frequency or hematuria  MUSCULOSKELETAL:  FROM all extremities, normal strength, No calf tenderness  NEUROLOGICAL:  No numbness or weakness  SKIN:  No itching, rashes        Vital Signs Last 24 Hrs  T(C): 36.5 (28 Aug 2023 20:02), Max: 37.3 (28 Aug 2023 16:28)  T(F): 97.7 (28 Aug 2023 20:02), Max: 99.2 (28 Aug 2023 16:28)  HR: 72 (28 Aug 2023 20:02) (72 - 80)  BP: 199/108 (28 Aug 2023 20:02) (199/108 - 229/115)  RR: 20 (28 Aug 2023 16:28) (20 - 20)  SpO2: 99% (28 Aug 2023 16:28) (99% - 99%)    Parameters below as of 28 Aug 2023 16:28  Patient On (Oxygen Delivery Method): room air        Physical Exam:  General:  WD, WN, female conversant in NAD.   Skin:  Warm dry with good color and turgor.  No rash or ulcers.   HEENT:   NC/AT, EOMI, normal hearing, no oral lesions, no LAD, neck supple.  Pulmonary:   CTA B/L, Normal respiratory effort. No W/R/R.   Cardiovascular:   S1 & S2, RRR, No murmurs, rubs or gallops.  Abdominal:  + BS, soft, No distention,  + Well healed midline abdominal surgical incision from umbilicus to lower abdomen, no mass or hernia palpated.  + Right sided abdominal tenderness.  No rebound, guarding or peritoneal signs.  Genitourinary:  No suprapubic tenderness, No CVAT.   Extremities:   Normal strength, no clubbing/cyanosis/edema.  Palpable distal pulses.   Neuro:  Awake, alert & oriented x 3,  CNs II-XII grossly intact, normal sensation, no focal deficits.        LABS:                        16.4   9.21  )-----------( 286      ( 28 Aug 2023 17:35 )             49.0         140  |  100  |  9<L>  ----------------------------<  90  3.9   |  29  |  0.8    Ca    10.1      28 Aug 2023 17:35    TPro  7.6  /  Alb  4.7  /  TBili  0.4  /  DBili  x   /  AST  16  /  ALT  12  /  AlkPhos  81        Urinalysis Basic - ( 28 Aug 2023 17:35 )    Color: Yellow / Appearance: Clear / S.014 / pH: x  Gluc: 90 mg/dL / Ketone: Negative mg/dL  / Bili: Negative / Urobili: 0.2 mg/dL   Blood: x / Protein: Negative mg/dL / Nitrite: Negative   Leuk Esterase: Negative / RBC: x / WBC x   Sq Epi: x / Non Sq Epi: x / Bacteria: x          CT Abdomen and Pelvis w/ Oral Cont and w/ IV Cont (23 @ 19:22)   ACC: [id number] EXAM:  CT ABDOMEN AND PELVIS OC IC   ORDERED BY: DEBI KYLE     PROCEDURE DATE:  2023        INTERPRETATION:  CLINICAL HISTORY / REASON FOR EXAM: Abdominal pain;   history of Crohn's.    TECHNIQUE: Contiguous axial CT images were obtained from the lower chest   to the pubic symphysis following administration of 95 mL Omnipaque 350   intravenous contrast, 5 mL discarded. Oral contrast was administered.   Reformatted images in the coronal and sagittal planes were acquired.    COMPARISON CT: CT abdomen and pelvis from 2022    OTHER STUDIES USED FOR CORRELATION: None.      FINDINGS:    LOWER CHEST: Bibasilar atelectasis/scarring.    HEPATOBILIARY: Unremarkable.    SPLEEN: Unremarkable.    PANCREAS: Unremarkable.    ADRENAL GLANDS: Unremarkable.    KIDNEYS: Symmetric enhancement bilaterally. No evidence of   hydronephrosis. Right renal hypodensities, including characterize.    ABDOMINOPELVIC NODES: Unremarkable.    PELVIC ORGANS: Unremarkable.    PERITONEUM/MESENTERY/BOWEL: Status post distal small bowel resection with   jejunal-colonic anastomosis. Transition point proximal to the small to   large bowel anastomosis (coronal series 601, image 41). Oral contrast   reaches the distal small bowel, fecalization of enteric contents.   Postsurgical changes of the sigmoid colon. No intraperitoneal free air.    BONES/SOFT TISSUES: No acute osseous abnormality. Postsurgical changes of   the anterior abdominal wall.    VASCULAR: Aorta is normal in caliber.      IMPRESSION:    Status post distal small bowel resection. Fecalization of small bowel   enteric contents with mildly dilated distal small bowel. Transition point   just proximal to the small to large bowel anastomosis. Findings can be   seen with early obstruction, possibly secondary to inflammatory   changes/stricturing of the small bowel.      < end of copied text >

## 2023-08-29 LAB
ALBUMIN SERPL ELPH-MCNC: 3.7 G/DL — SIGNIFICANT CHANGE UP (ref 3.5–5.2)
ALP SERPL-CCNC: 70 U/L — SIGNIFICANT CHANGE UP (ref 30–115)
ALT FLD-CCNC: 8 U/L — SIGNIFICANT CHANGE UP (ref 0–41)
ANION GAP SERPL CALC-SCNC: 8 MMOL/L — SIGNIFICANT CHANGE UP (ref 7–14)
AST SERPL-CCNC: 12 U/L — SIGNIFICANT CHANGE UP (ref 0–41)
BILIRUB SERPL-MCNC: 0.5 MG/DL — SIGNIFICANT CHANGE UP (ref 0.2–1.2)
BUN SERPL-MCNC: 7 MG/DL — LOW (ref 10–20)
CALCIUM SERPL-MCNC: 8.7 MG/DL — SIGNIFICANT CHANGE UP (ref 8.4–10.5)
CHLORIDE SERPL-SCNC: 105 MMOL/L — SIGNIFICANT CHANGE UP (ref 98–110)
CO2 SERPL-SCNC: 27 MMOL/L — SIGNIFICANT CHANGE UP (ref 17–32)
CREAT SERPL-MCNC: 0.8 MG/DL — SIGNIFICANT CHANGE UP (ref 0.7–1.5)
EGFR: 81 ML/MIN/1.73M2 — SIGNIFICANT CHANGE UP
ERYTHROCYTE [SEDIMENTATION RATE] IN BLOOD: 5 MM/HR — SIGNIFICANT CHANGE UP (ref 0–20)
GLUCOSE SERPL-MCNC: 98 MG/DL — SIGNIFICANT CHANGE UP (ref 70–99)
HCT VFR BLD CALC: 42.8 % — SIGNIFICANT CHANGE UP (ref 37–47)
HGB BLD-MCNC: 14.3 G/DL — SIGNIFICANT CHANGE UP (ref 12–16)
LACTATE SERPL-SCNC: 1 MMOL/L — SIGNIFICANT CHANGE UP (ref 0.7–2)
MCHC RBC-ENTMCNC: 31 PG — SIGNIFICANT CHANGE UP (ref 27–31)
MCHC RBC-ENTMCNC: 33.4 G/DL — SIGNIFICANT CHANGE UP (ref 32–37)
MCV RBC AUTO: 92.6 FL — SIGNIFICANT CHANGE UP (ref 81–99)
NRBC # BLD: 0 /100 WBCS — SIGNIFICANT CHANGE UP (ref 0–0)
PLATELET # BLD AUTO: 229 K/UL — SIGNIFICANT CHANGE UP (ref 130–400)
PMV BLD: 10.1 FL — SIGNIFICANT CHANGE UP (ref 7.4–10.4)
POTASSIUM SERPL-MCNC: 3.8 MMOL/L — SIGNIFICANT CHANGE UP (ref 3.5–5)
POTASSIUM SERPL-SCNC: 3.8 MMOL/L — SIGNIFICANT CHANGE UP (ref 3.5–5)
PROT SERPL-MCNC: 5.7 G/DL — LOW (ref 6–8)
RBC # BLD: 4.62 M/UL — SIGNIFICANT CHANGE UP (ref 4.2–5.4)
RBC # FLD: 12.7 % — SIGNIFICANT CHANGE UP (ref 11.5–14.5)
SODIUM SERPL-SCNC: 140 MMOL/L — SIGNIFICANT CHANGE UP (ref 135–146)
WBC # BLD: 7.24 K/UL — SIGNIFICANT CHANGE UP (ref 4.8–10.8)
WBC # FLD AUTO: 7.24 K/UL — SIGNIFICANT CHANGE UP (ref 4.8–10.8)

## 2023-08-29 RX ORDER — LISINOPRIL 2.5 MG/1
40 TABLET ORAL DAILY
Refills: 0 | Status: DISCONTINUED | OUTPATIENT
Start: 2023-08-29 | End: 2023-08-31

## 2023-08-29 RX ORDER — ATENOLOL 25 MG/1
25 TABLET ORAL DAILY
Refills: 0 | Status: DISCONTINUED | OUTPATIENT
Start: 2023-08-29 | End: 2023-08-31

## 2023-08-29 RX ORDER — KETOROLAC TROMETHAMINE 30 MG/ML
15 SYRINGE (ML) INJECTION EVERY 6 HOURS
Refills: 0 | Status: DISCONTINUED | OUTPATIENT
Start: 2023-08-29 | End: 2023-08-31

## 2023-08-29 RX ORDER — HYDRALAZINE HCL 50 MG
10 TABLET ORAL ONCE
Refills: 0 | Status: COMPLETED | OUTPATIENT
Start: 2023-08-29 | End: 2023-08-30

## 2023-08-29 RX ORDER — NIFEDIPINE 30 MG
30 TABLET, EXTENDED RELEASE 24 HR ORAL AT BEDTIME
Refills: 0 | Status: DISCONTINUED | OUTPATIENT
Start: 2023-08-29 | End: 2023-08-31

## 2023-08-29 RX ORDER — HYDRALAZINE HCL 50 MG
25 TABLET ORAL ONCE
Refills: 0 | Status: COMPLETED | OUTPATIENT
Start: 2023-08-29 | End: 2023-08-29

## 2023-08-29 RX ADMIN — Medication 100 MILLIGRAM(S): at 21:44

## 2023-08-29 RX ADMIN — Medication 100 MILLIGRAM(S): at 13:32

## 2023-08-29 RX ADMIN — Medication 0.1 MILLIGRAM(S): at 05:24

## 2023-08-29 RX ADMIN — Medication 650 MILLIGRAM(S): at 05:24

## 2023-08-29 RX ADMIN — Medication 30 MILLIGRAM(S): at 21:44

## 2023-08-29 RX ADMIN — Medication 30 MILLILITER(S): at 17:41

## 2023-08-29 RX ADMIN — ONDANSETRON 4 MILLIGRAM(S): 8 TABLET, FILM COATED ORAL at 06:23

## 2023-08-29 RX ADMIN — Medication 15 MILLIGRAM(S): at 09:30

## 2023-08-29 RX ADMIN — Medication 25 MILLIGRAM(S): at 23:06

## 2023-08-29 RX ADMIN — Medication 0.1 MILLIGRAM(S): at 14:12

## 2023-08-29 RX ADMIN — Medication 100 MILLIGRAM(S): at 05:28

## 2023-08-29 RX ADMIN — Medication 200 MILLIGRAM(S): at 10:11

## 2023-08-29 RX ADMIN — SODIUM CHLORIDE 50 MILLILITER(S): 9 INJECTION INTRAMUSCULAR; INTRAVENOUS; SUBCUTANEOUS at 10:11

## 2023-08-29 RX ADMIN — Medication 200 MILLIGRAM(S): at 17:37

## 2023-08-29 RX ADMIN — Medication 15 MILLIGRAM(S): at 00:01

## 2023-08-29 NOTE — CONSULT NOTE ADULT - SUBJECTIVE AND OBJECTIVE BOX
Chief complaint/Reason for consult: Crohn's disease    HPI:  68 year old female presents to ed with abdominal pain and diarrhea x 2 days. The pain is diffuse to abdomen central to just right of the midline. She has a pmhx of Crohns and diverticulitis which she reports her last flare up of Crohn's was in July and was given ciprofloxacin and flagyl w/ relief, There is mild  nausea, negative vomiting, fever, chills, chest pain, sob, or urinary sys. The patient reports she passed watery stool (diarrhea) today. Her surgical history is significant for a bowel resection for her SBO. (28 Aug 2023 21:42)    GI Updates: 68yFemale pmh HTN, Crohn's disease small bowel resection a year and a half ago, diagnosed with Crohn's disease 5-7 years ago not on any maintenance medications follows with Dr. Rodriguez. Patient presents for diffuse abdominal pain and nausea. Currently Patient denies nausea, vomiting, hematemesis, melena, blood in stool, diarrhea, constipation. +RLQ pain.      PAST MEDICAL & SURGICAL HISTORY:  Migraine      HTN (hypertension)      Diverticulitis      Ileitis      Terminal ileitis      Crohn's disease      SBO (small bowel obstruction)      History of bowel resection            Family history:  FAMILY HISTORY:    No GI cancers in first or second degree relatives    Social History: +cigarette smoking. No alcohol. No illegal drug use.    Allergies:        MEDICATIONS:        MEDICATIONS  (STANDING):  ciprofloxacin   IVPB      ciprofloxacin   IVPB 400 milliGRAM(s) IV Intermittent every 12 hours  metroNIDAZOLE  IVPB      metroNIDAZOLE  IVPB 500 milliGRAM(s) IV Intermittent every 8 hours  sodium chloride 0.9%. 1000 milliLiter(s) (50 mL/Hr) IV Continuous <Continuous>    MEDICATIONS  (PRN):  acetaminophen     Tablet .. 650 milliGRAM(s) Oral every 6 hours PRN Temp greater or equal to 38C (100.4F), Mild Pain (1 - 3)  aluminum hydroxide/magnesium hydroxide/simethicone Suspension 30 milliLiter(s) Oral every 4 hours PRN Dyspepsia  ketorolac   Injectable 15 milliGRAM(s) IV Push every 6 hours PRN Severe Pain (7 - 10)  melatonin 3 milliGRAM(s) Oral at bedtime PRN Insomnia  ondansetron Injectable 4 milliGRAM(s) IV Push every 8 hours PRN Nausea and/or Vomiting        REVIEW OF SYSTEMS  General:  No weight loss, fevers, or chills.  Eyes:  No reported pain or visual changes  ENT:  No sore throat or runny nose.  NECK: No stiffness or lymphadenopathy  CV:  No chest pain or palpitations.  Resp:  No shortness of breath, cough, wheezing or hemoptysis  GI:  +RLQ abdominal pain, no nausea, vomiting, dysphagia, diarrhea or constipation. No rectal bleeding, melena, or hematemesis.  Muscle:  No aches or weakness  Neuro:  No tingling, numbness       VITALS:   T(F): 96.6 (08-29-23 @ 13:00), Max: 98.6 (08-29-23 @ 00:22)  HR: 62 (08-29-23 @ 13:39) (62 - 95)  BP: 133/90 (08-29-23 @ 16:11) (133/90 - 199/108)  RR: 16 (08-29-23 @ 13:00) (16 - 18)  SpO2: 95% (08-29-23 @ 13:00) (95% - 99%)    PHYSICAL EXAM:  GENERAL: AAOx3, no acute distress.  HEAD:  Atraumatic, Normocephalic  EYES: conjunctiva and sclera clear  NECK: Supple, No thyromegaly   CHEST/LUNG: Clear to auscultation bilaterally; No wheeze, rhonchi, or rales  HEART: Regular rate and rhythm; normal S1, S2, No murmurs.  ABDOMEN: Soft, +RLQ tenderness, nondistended; Bowel sounds present  NEUROLOGY: No asterixis or tremor  SKIN: Intact, no jaundice          LABS:  08-29    140  |  105  |  7<L>  ----------------------------<  98  3.8   |  27  |  0.8    Ca    8.7      29 Aug 2023 06:43    TPro  5.7<L>  /  Alb  3.7  /  TBili  0.5  /  DBili  x   /  AST  12  /  ALT  8   /  AlkPhos  70  08-29                          14.3   7.24  )-----------( 229      ( 29 Aug 2023 06:43 )             42.8     LIVER FUNCTIONS - ( 29 Aug 2023 06:43 )  Alb: 3.7 g/dL / Pro: 5.7 g/dL / ALK PHOS: 70 U/L / ALT: 8 U/L / AST: 12 U/L / GGT: x               IMAGING:      < from: CT Abdomen and Pelvis w/ Oral Cont and w/ IV Cont (08.28.23 @ 19:22) >  ACC: [id number] EXAM:  CT ABDOMEN AND PELVIS OC IC   ORDERED BY: DEBI KYLE     PROCEDURE DATE:  08/28/2023          INTERPRETATION:  CLINICAL HISTORY / REASON FOR EXAM: Abdominal pain;   history of Crohn's.    TECHNIQUE: Contiguous axial CT images were obtained from the lower chest   to the pubic symphysis following administration of 95 mL Omnipaque 350   intravenous contrast, 5 mL discarded. Oral contrast was administered.   Reformatted images in the coronal and sagittal planes were acquired.    COMPARISON CT: CT abdomen and pelvis from January 2022    OTHER STUDIES USED FOR CORRELATION: None.      FINDINGS:    LOWER CHEST: Bibasilar atelectasis/scarring.    HEPATOBILIARY: Unremarkable.    SPLEEN: Unremarkable.    PANCREAS: Unremarkable.    ADRENAL GLANDS: Unremarkable.    KIDNEYS: Symmetric enhancement bilaterally. No evidence of   hydronephrosis. Right renal hypodensities, including characterize.    ABDOMINOPELVIC NODES: Unremarkable.    PELVIC ORGANS: Unremarkable.    PERITONEUM/MESENTERY/BOWEL: Status post distal small bowel resection with   jejunal-colonic anastomosis. Transition point proximal to the small to   large bowel anastomosis (coronal series 601, image 41). Oral contrast   reaches the distal small bowel, fecalization of enteric contents.   Postsurgical changes of the sigmoid colon. No intraperitoneal free air.    BONES/SOFT TISSUES: No acute osseous abnormality. Postsurgical changes of   the anterior abdominal wall.    VASCULAR: Aorta is normal in caliber.      IMPRESSION:    Status post distal small bowel resection. Fecalization of small bowel   enteric contents with mildly dilated distal small bowel. Transition point   just proximal to the small to large bowel anastomosis. Findings can be   seen with early obstruction, possibly secondary to inflammatory   changes/stricturing of the small bowel.    --- End of Report ---            JAMEE BENTON MD; Attending Radiologist  This document has been electronically signed. Aug 28 2023  7:46PM    < end of copied text >     Chief complaint/Reason for consult: Crohn's disease    HPI:  68 year old female presents to ed with abdominal pain and diarrhea x 2 days. The pain is diffuse to abdomen central to just right of the midline. She has a pmhx of Crohns and diverticulitis which she reports her last flare up of Crohn's was in July and was given ciprofloxacin and flagyl w/ relief, There is mild  nausea, negative vomiting, fever, chills, chest pain, sob, or urinary sys. The patient reports she passed watery stool (diarrhea) today. Her surgical history is significant for a bowel resection for her SBO. (28 Aug 2023 21:42)    GI Updates: 68yFemale pmh HTN, Crohn's disease small bowel resection a year and a half ago, diagnosed with Crohn's disease 5-7 years ago not on any maintenance medications follows with Dr. Rodriguez. Patient presents for diffuse abdominal pain and nausea. Currently Patient denies nausea, vomiting, hematemesis, melena, blood in stool,  constipation. +RLQ pain. Patient having diarrhea      PAST MEDICAL & SURGICAL HISTORY:  Migraine      HTN (hypertension)      Diverticulitis      Ileitis      Terminal ileitis      Crohn's disease      SBO (small bowel obstruction)      History of bowel resection            Family history:  FAMILY HISTORY:    No GI cancers in first or second degree relatives    Social History: +cigarette smoking. No alcohol. No illegal drug use.    Allergies:   morphine (Unknown)  Intolerances            MEDICATIONS: Home Medications:  atenolol 25 mg oral tablet: 1 tab(s) orally once a day (28 Aug 2023 22:17)  lisinopril 40 mg oral tablet: 1 orally 2 times a day (28 Aug 2023 22:17)      MEDICATIONS  (STANDING):  ciprofloxacin   IVPB      ciprofloxacin   IVPB 400 milliGRAM(s) IV Intermittent every 12 hours  metroNIDAZOLE  IVPB      metroNIDAZOLE  IVPB 500 milliGRAM(s) IV Intermittent every 8 hours  sodium chloride 0.9%. 1000 milliLiter(s) (50 mL/Hr) IV Continuous <Continuous>    MEDICATIONS  (PRN):  acetaminophen     Tablet .. 650 milliGRAM(s) Oral every 6 hours PRN Temp greater or equal to 38C (100.4F), Mild Pain (1 - 3)  aluminum hydroxide/magnesium hydroxide/simethicone Suspension 30 milliLiter(s) Oral every 4 hours PRN Dyspepsia  ketorolac   Injectable 15 milliGRAM(s) IV Push every 6 hours PRN Severe Pain (7 - 10)  melatonin 3 milliGRAM(s) Oral at bedtime PRN Insomnia  ondansetron Injectable 4 milliGRAM(s) IV Push every 8 hours PRN Nausea and/or Vomiting        REVIEW OF SYSTEMS  General:  No weight loss, fevers, or chills.  Eyes:  No reported pain or visual changes  ENT:  No sore throat or runny nose.  NECK: No stiffness or lymphadenopathy  CV:  No chest pain or palpitations.  Resp:  No shortness of breath, cough, wheezing or hemoptysis  GI:  +RLQ abdominal pain, no nausea, vomiting, dysphagia, +diarrhea no constipation. No rectal bleeding, melena, or hematemesis.  Neuro:  No tingling, numbness       VITALS:   T(F): 96.6 (08-29-23 @ 13:00), Max: 98.6 (08-29-23 @ 00:22)  HR: 62 (08-29-23 @ 13:39) (62 - 95)  BP: 133/90 (08-29-23 @ 16:11) (133/90 - 199/108)  RR: 16 (08-29-23 @ 13:00) (16 - 18)  SpO2: 95% (08-29-23 @ 13:00) (95% - 99%)    PHYSICAL EXAM:  GENERAL: AAOx3, no acute distress.  HEAD:  Atraumatic, Normocephalic  EYES: conjunctiva and sclera clear  NECK: Supple, No thyromegaly   CHEST/LUNG: Clear to auscultation bilaterally; No wheeze, rhonchi, or rales  HEART: Regular rate and rhythm; normal S1, S2, No murmurs.  ABDOMEN: Soft, +RLQ tenderness, nondistended; Bowel sounds present  NEUROLOGY: No asterixis or tremor  SKIN: Intact, no jaundice          LABS:  08-29    140  |  105  |  7<L>  ----------------------------<  98  3.8   |  27  |  0.8    Ca    8.7      29 Aug 2023 06:43    TPro  5.7<L>  /  Alb  3.7  /  TBili  0.5  /  DBili  x   /  AST  12  /  ALT  8   /  AlkPhos  70  08-29                          14.3   7.24  )-----------( 229      ( 29 Aug 2023 06:43 )             42.8     LIVER FUNCTIONS - ( 29 Aug 2023 06:43 )  Alb: 3.7 g/dL / Pro: 5.7 g/dL / ALK PHOS: 70 U/L / ALT: 8 U/L / AST: 12 U/L / GGT: x               IMAGING:      < from: CT Abdomen and Pelvis w/ Oral Cont and w/ IV Cont (08.28.23 @ 19:22) >  ACC: [id number] EXAM:  CT ABDOMEN AND PELVIS OC IC   ORDERED BY: DEBI KYLE     PROCEDURE DATE:  08/28/2023          INTERPRETATION:  CLINICAL HISTORY / REASON FOR EXAM: Abdominal pain;   history of Crohn's.    TECHNIQUE: Contiguous axial CT images were obtained from the lower chest   to the pubic symphysis following administration of 95 mL Omnipaque 350   intravenous contrast, 5 mL discarded. Oral contrast was administered.   Reformatted images in the coronal and sagittal planes were acquired.    COMPARISON CT: CT abdomen and pelvis from January 2022    OTHER STUDIES USED FOR CORRELATION: None.      FINDINGS:    LOWER CHEST: Bibasilar atelectasis/scarring.    HEPATOBILIARY: Unremarkable.    SPLEEN: Unremarkable.    PANCREAS: Unremarkable.    ADRENAL GLANDS: Unremarkable.    KIDNEYS: Symmetric enhancement bilaterally. No evidence of   hydronephrosis. Right renal hypodensities, including characterize.    ABDOMINOPELVIC NODES: Unremarkable.    PELVIC ORGANS: Unremarkable.    PERITONEUM/MESENTERY/BOWEL: Status post distal small bowel resection with   jejunal-colonic anastomosis. Transition point proximal to the small to   large bowel anastomosis (coronal series 601, image 41). Oral contrast   reaches the distal small bowel, fecalization of enteric contents.   Postsurgical changes of the sigmoid colon. No intraperitoneal free air.    BONES/SOFT TISSUES: No acute osseous abnormality. Postsurgical changes of   the anterior abdominal wall.    VASCULAR: Aorta is normal in caliber.      IMPRESSION:    Status post distal small bowel resection. Fecalization of small bowel   enteric contents with mildly dilated distal small bowel. Transition point   just proximal to the small to large bowel anastomosis. Findings can be   seen with early obstruction, possibly secondary to inflammatory   changes/stricturing of the small bowel.    --- End of Report ---            JAMEE BENTON MD; Attending Radiologist  This document has been electronically signed. Aug 28 2023  7:46PM    < end of copied text >

## 2023-08-29 NOTE — PROGRESS NOTE ADULT - SUBJECTIVE AND OBJECTIVE BOX
S; Pt with mildly improved right sided abdominal pain. Had 2 watery BM's today with some solid components. Denies N/V/F/C  O: Vital Signs Last 24 Hrs  T(C): 35.9 (29 Aug 2023 13:00), Max: 37.3 (28 Aug 2023 16:28)  T(F): 96.6 (29 Aug 2023 13:00), Max: 99.2 (28 Aug 2023 16:28)  HR: 62 (29 Aug 2023 13:39) (62 - 95)  BP: 177/88 (29 Aug 2023 13:39) (144/82 - 229/115)  BP(mean): --  RR: 16 (29 Aug 2023 13:00) (16 - 20)  SpO2: 95% (29 Aug 2023 13:00) (95% - 99%)    Parameters below as of 29 Aug 2023 13:00  Patient On (Oxygen Delivery Method): room air    MEDICATIONS  (STANDING):  ciprofloxacin   IVPB      ciprofloxacin   IVPB 400 milliGRAM(s) IV Intermittent every 12 hours  metroNIDAZOLE  IVPB      metroNIDAZOLE  IVPB 500 milliGRAM(s) IV Intermittent every 8 hours  sodium chloride 0.9%. 1000 milliLiter(s) (50 mL/Hr) IV Continuous <Continuous>    MEDICATIONS  (PRN):  acetaminophen     Tablet .. 650 milliGRAM(s) Oral every 6 hours PRN Temp greater or equal to 38C (100.4F), Mild Pain (1 - 3)  aluminum hydroxide/magnesium hydroxide/simethicone Suspension 30 milliLiter(s) Oral every 4 hours PRN Dyspepsia  ketorolac   Injectable 15 milliGRAM(s) IV Push every 6 hours PRN Severe Pain (7 - 10)  melatonin 3 milliGRAM(s) Oral at bedtime PRN Insomnia  ondansetron Injectable 4 milliGRAM(s) IV Push every 8 hours PRN Nausea and/or Vomiting    EXAM:  GEN: NAD  abd: soft, mildly distended, +mild right sided TTP, no rebound/guarding    Labs:  CAPILLARY BLOOD GLUCOSE                              14.3   7.24  )-----------( 229      ( 29 Aug 2023 06:43 )             42.8       Auto Neutrophil %: 68.5 % (08-28-23 @ 17:35)  Auto Immature Granulocyte %: 0.3 % (08-28-23 @ 17:35)    08-29    140  |  105  |  7<L>  ----------------------------<  98  3.8   |  27  |  0.8      Calcium: 8.7 mg/dL (08-29-23 @ 06:43)      LFTs:             5.7  | 0.5  | 12       ------------------[70      ( 29 Aug 2023 06:43 )  3.7  | x    | 8           Lipase:x      Amylase:x         Lactate, Blood: 1.0 mmol/L (08-29-23 @ 06:43)  Blood Gas Venous - Lactate: 1.20 mmol/L (08-28-23 @ 17:43)      Coags:        Urinalysis Basic - ( 29 Aug 2023 06:43 )    Color: x / Appearance: x / SG: x / pH: x  Gluc: 98 mg/dL / Ketone: x  / Bili: x / Urobili: x   Blood: x / Protein: x / Nitrite: x   Leuk Esterase: x / RBC: x / WBC x   Sq Epi: x / Non Sq Epi: x / Bacteria: x

## 2023-08-29 NOTE — PROGRESS NOTE ADULT - ATTENDING COMMENTS
above noted discussed case with surgical resident and PA abdomen soft tenderness/distension less medical follow up noted

## 2023-08-29 NOTE — CONSULT NOTE ADULT - ASSESSMENT
68yFemale pmh HTN, Crohn's disease small bowel resection a year and a half ago, diagnosed with Crohn's disease 5-7 years ago not on any maintenance medications follows with Dr. Rodriguez. Patient presents for diffuse abdominal pain and nausea.      Problem 1-Abdominal pain  possible early SBO, etiology possibly to to Crohn's stricturing, active Crohn's disease   Status post distal small bowel resection. Fecalization of small bowel   enteric contents with mildly dilated distal small bowel. Transition point   just proximal to the small to large bowel anastomosis. Findings can be   seen with early obstruction, possibly secondary to inflammatory   changes/stricturing of the small bowel.  Rec  -Obtain ESR, CRP  -will follow clinical course to decide on steroids   -Keep patient NPO  -NG tube if N/v  -C-diff and GI PCR  -obtain obstructive series tomorrow  -fecal calprotectin  -patient scheduled for GI workup with Dr. Rodriguez in September   -macho mendenhall   68yFemale pmh HTN, Crohn's disease small bowel resection a year and a half ago, diagnosed with Crohn's disease 5-7 years ago not on any maintenance medications follows with Dr. Rodriguez. Patient presents for diffuse abdominal pain and nausea.      Problem 1-Abdominal pain  possible early SBO, etiology possibly to to Crohn's stricturing, active Crohn's disease   Status post distal small bowel resection. Fecalization of small bowel   enteric contents with mildly dilated distal small bowel. Transition point   just proximal to the small to large bowel anastomosis. Findings can be   seen with early obstruction, possibly secondary to inflammatory   changes/stricturing of the small bowel.  -patient having diarrhea  Rec  -Obtain ESR, CRP  -will follow clinical course to decide on steroids   -Keep patient NPO  -NG tube if N/v  -C-diff and GI PCR  -obtain obstructive series tomorrow  -fecal calprotectin  -patient scheduled for GI workup with Dr. Rodriguez in September, will need to consider biologics outpatient   -macho mendenhall

## 2023-08-29 NOTE — CHART NOTE - NSCHARTNOTEFT_GEN_A_CORE
Patient seen at bedside. Reports improved abdominal pain. Reports 2 loose BMs since admission and is passing gas. Denies chest pain, sob, fevers.    Vital Signs Last 24 Hrs  T(C): 36.2 (29 Aug 2023 05:09), Max: 37.3 (28 Aug 2023 16:28)  T(F): 97.1 (29 Aug 2023 05:09), Max: 99.2 (28 Aug 2023 16:28)  HR: 76 (29 Aug 2023 06:37) (70 - 80)  BP: 178/92 (29 Aug 2023 06:37) (144/82 - 229/115)  RR: 18 (29 Aug 2023 06:37) (18 - 20)  SpO2: 99% (29 Aug 2023 00:22) (99% - 99%)    Parameters below as of 29 Aug 2023 00:22  Patient On (Oxygen Delivery Method): room air    GENERAL:  67y/o Female NAD, resting comfortably.  CHEST/LUNG: Clear to auscultation bilaterally; No wheeze, rhonchi, or rales  HEART: Regular rate and rhythm; S1&S2  ABDOMEN: Soft, generalized ttp to lower abdomen, Nondistended x 4 quadrants; Bowel sounds present    Plan:   - NPO/IVF  - Toradol 15mg IVP q6 for severe pain  - Pending GI FU   - Surgery on board

## 2023-08-29 NOTE — PROGRESS NOTE ADULT - ASSESSMENT
69 y/o female with medical history of HTN, Everday smoker, Migraine HA's, and Crohn's disease who presents in the ER with 3 day history of Right sided abdominal pains     # Early PSBO,  R/O Crohn's exacerbation.   - no acute surgical intervention  - cont NPO, IV hydration, pain mgmt.  - F/U GI consult for possible Crohn's exacerbation.  - serial abdominal exams  - Monitor daily labs.   -will D/W atttdg

## 2023-08-30 LAB
C DIFF BY PCR RESULT: SIGNIFICANT CHANGE UP
CRP SERPL-MCNC: 5 MG/L — HIGH
GI PCR PANEL: SIGNIFICANT CHANGE UP

## 2023-08-30 PROCEDURE — 99233 SBSQ HOSP IP/OBS HIGH 50: CPT

## 2023-08-30 PROCEDURE — 74019 RADEX ABDOMEN 2 VIEWS: CPT | Mod: 26

## 2023-08-30 RX ORDER — HYDROXYZINE HCL 10 MG
25 TABLET ORAL ONCE
Refills: 0 | Status: COMPLETED | OUTPATIENT
Start: 2023-08-30 | End: 2023-08-30

## 2023-08-30 RX ORDER — HEPARIN SODIUM 5000 [USP'U]/ML
5000 INJECTION INTRAVENOUS; SUBCUTANEOUS EVERY 12 HOURS
Refills: 0 | Status: DISCONTINUED | OUTPATIENT
Start: 2023-08-30 | End: 2023-08-31

## 2023-08-30 RX ADMIN — Medication 25 MILLIGRAM(S): at 22:34

## 2023-08-30 RX ADMIN — Medication 200 MILLIGRAM(S): at 05:18

## 2023-08-30 RX ADMIN — LISINOPRIL 40 MILLIGRAM(S): 2.5 TABLET ORAL at 05:19

## 2023-08-30 RX ADMIN — Medication 100 MILLIGRAM(S): at 06:01

## 2023-08-30 RX ADMIN — Medication 15 MILLIGRAM(S): at 16:10

## 2023-08-30 RX ADMIN — Medication 30 MILLIGRAM(S): at 21:17

## 2023-08-30 RX ADMIN — Medication 15 MILLIGRAM(S): at 07:54

## 2023-08-30 RX ADMIN — Medication 15 MILLIGRAM(S): at 06:07

## 2023-08-30 RX ADMIN — Medication 10 MILLIGRAM(S): at 00:29

## 2023-08-30 RX ADMIN — ATENOLOL 25 MILLIGRAM(S): 25 TABLET ORAL at 05:19

## 2023-08-30 RX ADMIN — Medication 15 MILLIGRAM(S): at 22:37

## 2023-08-30 RX ADMIN — Medication 40 MILLIGRAM(S): at 21:16

## 2023-08-30 NOTE — MEDICAL STUDENT PROGRESS NOTE(EDUCATION) - SUBJECTIVE AND OBJECTIVE BOX
Subjective:   Pt is a 68 year old female with a past medical history of Crohn's and diverticulitis with a small bowel resection due to abdominal abscess. Pt presents today with mild discomfort in the abdomen. Pt states she has begun having flatulence and defecating. Pt is ambulating well and getting out of bed. Pt has clear bowel sounds upon auscultation. Pt is afebrile.     Objective:    Physical Exam:  General: NAD, resting comfortably  HEENT: NC/AT, EOMI, normal hearing, no oral lesions, no LAD, neck supple  Pulmonary: CTA B/L  Cardiovascular: S1, S2 w/RRR  Abdominal: soft, ND/NT  Extremities:  no clubbing/cyanosis/edema  Neuro: A/O x 3    Vital Signs Last 24 Hrs  T(C): 35.8 (30 Aug 2023 12:55), Max: 36.1 (29 Aug 2023 21:06)  T(F): 96.4 (30 Aug 2023 12:55), Max: 97 (29 Aug 2023 21:06)  HR: 64 (30 Aug 2023 12:55) (60 - 86)  BP: 135/78 (30 Aug 2023 12:55) (133/90 - 189/91)  BP(mean): --  RR: 16 (30 Aug 2023 12:55) (16 - 17)  SpO2: 94% (30 Aug 2023 12:55) (94% - 100%)  Parameters below as of 30 Aug 2023 12:55  Patient On (Oxygen Delivery Method): room air    PAST MEDICAL & SURGICAL HISTORY:  Migraine  HTN (hypertension)  Diverticulitis  Ileitis  Terminal ileitis  Crohn's disease  SBO (small bowel obstruction)  History of bowel resection    MEDICATIONS  (STANDING):  atenolol  Tablet 25 milliGRAM(s) Oral daily  ciprofloxacin   IVPB      ciprofloxacin   IVPB 400 milliGRAM(s) IV Intermittent every 12 hours  heparin   Injectable 5000 Unit(s) SubCutaneous every 12 hours  lisinopril 40 milliGRAM(s) Oral daily  methylPREDNISolone sodium succinate Injectable 40 milliGRAM(s) IV Push two times a day  metroNIDAZOLE  IVPB      metroNIDAZOLE  IVPB 500 milliGRAM(s) IV Intermittent every 8 hours  NIFEdipine XL 30 milliGRAM(s) Oral at bedtime  sodium chloride 0.9%. 1000 milliLiter(s) (50 mL/Hr) IV Continuous <Continuous>    MEDICATIONS  (PRN):  acetaminophen     Tablet .. 650 milliGRAM(s) Oral every 6 hours PRN Temp greater or equal to 38C (100.4F), Mild Pain (1 - 3)  aluminum hydroxide/magnesium hydroxide/simethicone Suspension 30 milliLiter(s) Oral every 4 hours PRN Dyspepsia  busPIRone 5 milliGRAM(s) Oral two times a day PRN anxiety  ketorolac   Injectable 15 milliGRAM(s) IV Push every 6 hours PRN Severe Pain (7 - 10)  melatonin 3 milliGRAM(s) Oral at bedtime PRN Insomnia  ondansetron Injectable 4 milliGRAM(s) IV Push every 8 hours PRN Nausea and/or Vomiting    Allergies    morphine (Unknown)  Intolerances  I&O's Summary    LABS:                        14.3   7.24  )-----------( 229      ( 29 Aug 2023 06:43 )             42.8     08-29    140  |  105  |  7<L>  ----------------------------<  98  3.8   |  27  |  0.8    Ca    8.7      29 Aug 2023 06:43    TPro  5.7<L>  /  Alb  3.7  /  TBili  0.5  /  DBili  x   /  AST  12  /  ALT  8   /  AlkPhos  70  08-29      Urinalysis Basic - ( 29 Aug 2023 06:43 )    Color: x / Appearance: x / SG: x / pH: x  Gluc: 98 mg/dL / Ketone: x  / Bili: x / Urobili: x   Blood: x / Protein: x / Nitrite: x   Leuk Esterase: x / RBC: x / WBC x   Sq Epi: x / Non Sq Epi: x / Bacteria: x      CAPILLARY BLOOD GLUCOSE        LIVER FUNCTIONS - ( 29 Aug 2023 06:43 )  Alb: 3.7 g/dL / Pro: 5.7 g/dL / ALK PHOS: 70 U/L / ALT: 8 U/L / AST: 12 U/L / GGT: x             Cultures:      RADIOLOGY & ADDITIONAL STUDIES:  < from: PACS Image (Xray Abdomen 2 Views) (08.30.23 @ 09:20) >      < end of copied text >  < from: Xray Abdomen 2 Views (08.30.23 @ 09:20) >      < end of copied text >

## 2023-08-30 NOTE — PROGRESS NOTE ADULT - SUBJECTIVE AND OBJECTIVE BOX
68yFemale  Being followed for Crohn's disease   Interval history: Patient denies nausea, vomiting, hematemesis, melena, blood in stool, diarrhea, constipation, abdominal pain. Patient with one loose stool today. patient with RLQ pain.      PAST MEDICAL & SURGICAL HISTORY:   Migraine      HTN (hypertension)      Diverticulitis      Ileitis      Terminal ileitis      Crohn's disease      SBO (small bowel obstruction)      History of bowel resection                Social History: No smoking. No alcohol. No illegal drug use.          MEDICATIONS  (STANDING):  atenolol  Tablet 25 milliGRAM(s) Oral daily  ciprofloxacin   IVPB      ciprofloxacin   IVPB 400 milliGRAM(s) IV Intermittent every 12 hours  heparin   Injectable 5000 Unit(s) SubCutaneous every 12 hours  lisinopril 40 milliGRAM(s) Oral daily  methylPREDNISolone sodium succinate Injectable 40 milliGRAM(s) IV Push two times a day  metroNIDAZOLE  IVPB      metroNIDAZOLE  IVPB 500 milliGRAM(s) IV Intermittent every 8 hours  NIFEdipine XL 30 milliGRAM(s) Oral at bedtime  sodium chloride 0.9%. 1000 milliLiter(s) (50 mL/Hr) IV Continuous <Continuous>    MEDICATIONS  (PRN):  acetaminophen     Tablet .. 650 milliGRAM(s) Oral every 6 hours PRN Temp greater or equal to 38C (100.4F), Mild Pain (1 - 3)  aluminum hydroxide/magnesium hydroxide/simethicone Suspension 30 milliLiter(s) Oral every 4 hours PRN Dyspepsia  busPIRone 5 milliGRAM(s) Oral two times a day PRN anxiety  ketorolac   Injectable 15 milliGRAM(s) IV Push every 6 hours PRN Severe Pain (7 - 10)  melatonin 3 milliGRAM(s) Oral at bedtime PRN Insomnia  ondansetron Injectable 4 milliGRAM(s) IV Push every 8 hours PRN Nausea and/or Vomiting      Allergies:   morphine (Unknown)  Intolerances          REVIEW OF SYSTEMS:  General:  No weight loss, fevers, or chills.  Eyes:  No reported pain or visual changes  ENT:  No sore throat or runny nose.  NECK: No stiffness   CV:  No chest pain or palpitations.  Resp:  No shortness of breath, cough  GI:  No abdominal pain, nausea, vomiting, dysphagia, diarrhea or constipation. No rectal bleeding, melena, or hematemesis.  Muscle:  No aches or weakness  Neuro:  No tingling, numbness       VITAL SIGNS:   T(F): 96.9 (08-30-23 @ 04:35), Max: 97 (08-29-23 @ 21:06)  HR: 86 (08-30-23 @ 04:35) (60 - 95)  BP: 144/68 (08-30-23 @ 04:35) (133/90 - 189/91)  RR: 16 (08-30-23 @ 04:35) (16 - 17)  SpO2: 100% (08-29-23 @ 21:06) (95% - 100%)    PHYSICAL EXAM:  GENERAL: AAOx3, no acute distress.  HEAD:  Atraumatic, Normocephalic  EYES: conjunctiva and sclera clear  NECK: Supple, no JVD or thyromegaly  CHEST/LUNG: Clear to auscultation bilaterally; No wheeze, rhonchi, or rales  HEART: Regular rate and rhythm; normal S1, S2, No murmurs.  ABDOMEN: Soft, nontender, nondistended; Bowel sounds present  NEUROLOGY: No asterixis or tremor.   SKIN: Intact, no jaundice            LABS:                        14.3   7.24  )-----------( 229      ( 29 Aug 2023 06:43 )             42.8     08-29    140  |  105  |  7<L>  ----------------------------<  98  3.8   |  27  |  0.8    Ca    8.7      29 Aug 2023 06:43    TPro  5.7<L>  /  Alb  3.7  /  TBili  0.5  /  DBili  x   /  AST  12  /  ALT  8   /  AlkPhos  70  08-29    LIVER FUNCTIONS - ( 29 Aug 2023 06:43 )  Alb: 3.7 g/dL / Pro: 5.7 g/dL / ALK PHOS: 70 U/L / ALT: 8 U/L / AST: 12 U/L / GGT: x               IMAGING:    < from: CT Abdomen and Pelvis w/ Oral Cont and w/ IV Cont (08.28.23 @ 19:22) >    ACC: [id number] EXAM:  CT ABDOMEN AND PELVIS OC IC   ORDERED BY: DEBI KYLE     PROCEDURE DATE:  08/28/2023          INTERPRETATION:  CLINICAL HISTORY / REASON FOR EXAM: Abdominal pain;   history of Crohn's.    TECHNIQUE: Contiguous axial CT images were obtained from the lower chest   to the pubic symphysis following administration of 95 mL Omnipaque 350   intravenous contrast, 5 mL discarded. Oral contrast was administered.   Reformatted images in the coronal and sagittal planes were acquired.    COMPARISON CT: CT abdomen and pelvis from January 2022    OTHER STUDIES USED FOR CORRELATION: None.      FINDINGS:    LOWER CHEST: Bibasilar atelectasis/scarring.    HEPATOBILIARY: Unremarkable.    SPLEEN: Unremarkable.    PANCREAS: Unremarkable.    ADRENAL GLANDS: Unremarkable.    KIDNEYS: Symmetric enhancement bilaterally. No evidence of   hydronephrosis. Right renal hypodensities, including characterize.    ABDOMINOPELVIC NODES: Unremarkable.    PELVIC ORGANS: Unremarkable.    PERITONEUM/MESENTERY/BOWEL: Status post distal small bowel resection with   jejunal-colonic anastomosis. Transition point proximal to the small to   large bowel anastomosis (coronal series 601, image 41). Oral contrast   reaches the distal small bowel, fecalization of enteric contents.   Postsurgical changes of the sigmoid colon. No intraperitoneal free air.    BONES/SOFT TISSUES: No acute osseous abnormality. Postsurgical changes of   the anterior abdominal wall.    VASCULAR: Aorta is normal in caliber.      IMPRESSION:    Status post distal small bowel resection. Fecalization of small bowel   enteric contents with mildly dilated distal small bowel. Transition point   just proximal to the small to large bowel anastomosis. Findings can be   seen with early obstruction, possibly secondary to inflammatory   changes/stricturing of the small bowel.    --- End of Report ---            JAMEE BENTON MD; Attending Radiologist  This document has been electronically signed. Aug 28 2023  7:46PM    < end of copied text >      < from: Xray Abdomen 2 Views (08.30.23 @ 09:20) >  ACC: [id number] EXAM:  XR ABDOMEN 2V   ORDERED BY: FELISA JIMENEZ     PROCEDURE DATE:  08/30/2023          INTERPRETATION:  Clinical History / Reason for exam: Abdominal pain  2. Images  Previous exam May 28, 2023  The bowel gas pattern appears normal. No free air is seen.    IMPRESSION: Normal bowel gas pattern    --- End of Report ---            DION BOWMAN MD; Attending Radiologist  This document has been electronically signed. Aug 30 2023 10:45AM    < end of copied text >     68yFemale  Being followed for Crohn's disease   Interval history: Patient denies nausea, vomiting, hematemesis, melena, blood in stool, diarrhea, constipation. Patient with one loose stool today. patient with RLQ pain.      PAST MEDICAL & SURGICAL HISTORY:   Migraine      HTN (hypertension)      Diverticulitis      Ileitis      Terminal ileitis      Crohn's disease      SBO (small bowel obstruction)      History of bowel resection                Social History: No smoking. No alcohol. No illegal drug use.          MEDICATIONS  (STANDING):  atenolol  Tablet 25 milliGRAM(s) Oral daily  ciprofloxacin   IVPB      ciprofloxacin   IVPB 400 milliGRAM(s) IV Intermittent every 12 hours  heparin   Injectable 5000 Unit(s) SubCutaneous every 12 hours  lisinopril 40 milliGRAM(s) Oral daily  methylPREDNISolone sodium succinate Injectable 40 milliGRAM(s) IV Push two times a day  metroNIDAZOLE  IVPB      metroNIDAZOLE  IVPB 500 milliGRAM(s) IV Intermittent every 8 hours  NIFEdipine XL 30 milliGRAM(s) Oral at bedtime  sodium chloride 0.9%. 1000 milliLiter(s) (50 mL/Hr) IV Continuous <Continuous>    MEDICATIONS  (PRN):  acetaminophen     Tablet .. 650 milliGRAM(s) Oral every 6 hours PRN Temp greater or equal to 38C (100.4F), Mild Pain (1 - 3)  aluminum hydroxide/magnesium hydroxide/simethicone Suspension 30 milliLiter(s) Oral every 4 hours PRN Dyspepsia  busPIRone 5 milliGRAM(s) Oral two times a day PRN anxiety  ketorolac   Injectable 15 milliGRAM(s) IV Push every 6 hours PRN Severe Pain (7 - 10)  melatonin 3 milliGRAM(s) Oral at bedtime PRN Insomnia  ondansetron Injectable 4 milliGRAM(s) IV Push every 8 hours PRN Nausea and/or Vomiting      Allergies:   morphine (Unknown)  Intolerances          REVIEW OF SYSTEMS:  General:  No weight loss, fevers, or chills.  Eyes:  No reported pain or visual changes  ENT:  No sore throat or runny nose.  NECK: No stiffness   CV:  No chest pain or palpitations.  Resp:  No shortness of breath, cough  GI:  +RLQ abdominal pain, no nausea, vomiting, dysphagia, diarrhea or constipation. No rectal bleeding, melena, or hematemesis.  Muscle:  No aches or weakness  Neuro:  No tingling, numbness       VITAL SIGNS:   T(F): 96.9 (08-30-23 @ 04:35), Max: 97 (08-29-23 @ 21:06)  HR: 86 (08-30-23 @ 04:35) (60 - 95)  BP: 144/68 (08-30-23 @ 04:35) (133/90 - 189/91)  RR: 16 (08-30-23 @ 04:35) (16 - 17)  SpO2: 100% (08-29-23 @ 21:06) (95% - 100%)    PHYSICAL EXAM:  GENERAL: AAOx3, no acute distress.  HEAD:  Atraumatic, Normocephalic  EYES: conjunctiva and sclera clear  NECK: Supple, no JVD or thyromegaly  CHEST/LUNG: Clear to auscultation bilaterally; No wheeze, rhonchi, or rales  HEART: Regular rate and rhythm; normal S1, S2, No murmurs.  ABDOMEN: Soft, +RLQ tenderness, nondistended; Bowel sounds present  NEUROLOGY: No asterixis or tremor.   SKIN: Intact, no jaundice            LABS:                        14.3   7.24  )-----------( 229      ( 29 Aug 2023 06:43 )             42.8     08-29    140  |  105  |  7<L>  ----------------------------<  98  3.8   |  27  |  0.8    Ca    8.7      29 Aug 2023 06:43    TPro  5.7<L>  /  Alb  3.7  /  TBili  0.5  /  DBili  x   /  AST  12  /  ALT  8   /  AlkPhos  70  08-29    LIVER FUNCTIONS - ( 29 Aug 2023 06:43 )  Alb: 3.7 g/dL / Pro: 5.7 g/dL / ALK PHOS: 70 U/L / ALT: 8 U/L / AST: 12 U/L / GGT: x               IMAGING:    < from: CT Abdomen and Pelvis w/ Oral Cont and w/ IV Cont (08.28.23 @ 19:22) >    ACC: [id number] EXAM:  CT ABDOMEN AND PELVIS OC IC   ORDERED BY: DEBI KYLE     PROCEDURE DATE:  08/28/2023          INTERPRETATION:  CLINICAL HISTORY / REASON FOR EXAM: Abdominal pain;   history of Crohn's.    TECHNIQUE: Contiguous axial CT images were obtained from the lower chest   to the pubic symphysis following administration of 95 mL Omnipaque 350   intravenous contrast, 5 mL discarded. Oral contrast was administered.   Reformatted images in the coronal and sagittal planes were acquired.    COMPARISON CT: CT abdomen and pelvis from January 2022    OTHER STUDIES USED FOR CORRELATION: None.      FINDINGS:    LOWER CHEST: Bibasilar atelectasis/scarring.    HEPATOBILIARY: Unremarkable.    SPLEEN: Unremarkable.    PANCREAS: Unremarkable.    ADRENAL GLANDS: Unremarkable.    KIDNEYS: Symmetric enhancement bilaterally. No evidence of   hydronephrosis. Right renal hypodensities, including characterize.    ABDOMINOPELVIC NODES: Unremarkable.    PELVIC ORGANS: Unremarkable.    PERITONEUM/MESENTERY/BOWEL: Status post distal small bowel resection with   jejunal-colonic anastomosis. Transition point proximal to the small to   large bowel anastomosis (coronal series 601, image 41). Oral contrast   reaches the distal small bowel, fecalization of enteric contents.   Postsurgical changes of the sigmoid colon. No intraperitoneal free air.    BONES/SOFT TISSUES: No acute osseous abnormality. Postsurgical changes of   the anterior abdominal wall.    VASCULAR: Aorta is normal in caliber.      IMPRESSION:    Status post distal small bowel resection. Fecalization of small bowel   enteric contents with mildly dilated distal small bowel. Transition point   just proximal to the small to large bowel anastomosis. Findings can be   seen with early obstruction, possibly secondary to inflammatory   changes/stricturing of the small bowel.    --- End of Report ---            JAMEE BENTON MD; Attending Radiologist  This document has been electronically signed. Aug 28 2023  7:46PM    < end of copied text >      < from: Xray Abdomen 2 Views (08.30.23 @ 09:20) >  ACC: [id number] EXAM:  XR ABDOMEN 2V   ORDERED BY: FELISA JIMENEZ     PROCEDURE DATE:  08/30/2023          INTERPRETATION:  Clinical History / Reason for exam: Abdominal pain  2. Images  Previous exam May 28, 2023  The bowel gas pattern appears normal. No free air is seen.    IMPRESSION: Normal bowel gas pattern    --- End of Report ---            DION BOWMAN MD; Attending Radiologist  This document has been electronically signed. Aug 30 2023 10:45AM    < end of copied text >

## 2023-08-30 NOTE — CHART NOTE - NSCHARTNOTEFT_GEN_A_CORE
Patient seen and examined at bedside.   No acute events overnight.   Pt reports feeling better overall, however, still RLQ discomfort. She passes flatus and reports small BM.     T(C): 36.1 (08-30-23 @ 04:35), Max: 36.1 (08-29-23 @ 21:06)  T(F): 96.9 (08-30-23 @ 04:35), Max: 97 (08-29-23 @ 21:06)  HR: 86 (08-30-23 @ 04:35) (60 - 95)  BP: 144/68 (08-30-23 @ 04:35) (133/90 - 189/91)  RR: 16 (08-30-23 @ 04:35) (16 - 17)  SpO2: 100% (08-29-23 @ 21:06) (95% - 100%)    Acute Diverticulitis and likely crohn`s flare up  - will start Solu-Medrol 40 iv bid  - continue IV fluids 100ml/hr  - NPO for now and will start clear diet for dinner  - IV Cipro and flagyl    Results of Labs discussed as well as patient's plan.  All patient's questions answered.    Case was d/w Dr. Gomez. Patient seen and examined at bedside.   No acute events overnight.   Pt reports feeling better overall, however, still RLQ discomfort. She passes flatus and reports small BM.     T(C): 36.1 (08-30-23 @ 04:35), Max: 36.1 (08-29-23 @ 21:06)  T(F): 96.9 (08-30-23 @ 04:35), Max: 97 (08-29-23 @ 21:06)  HR: 86 (08-30-23 @ 04:35) (60 - 95)  BP: 144/68 (08-30-23 @ 04:35) (133/90 - 189/91)  RR: 16 (08-30-23 @ 04:35) (16 - 17)  SpO2: 100% (08-29-23 @ 21:06) (95% - 100%)    Acute Diverticulitis and likely crohn`s flare up  - will start Solu-Medrol 40 iv bid  - pt reports getting anxious while on steroids: will give a trial of Buspar 5 mg po bid prn  - continue IV fluids 100ml/hr  - NPO for now and will start clear diet for dinner  - IV Cipro and flagyl  - DVT ppx    Results of Labs discussed as well as patient's plan.  All patient's questions answered.    Case was d/w Dr. Gomez.

## 2023-08-30 NOTE — CHART NOTE - NSCHARTNOTEFT_GEN_A_CORE
Case was discussed with Dr. Gomez who recommended continue IV abx and steroids for now.   Will anticipate dc tomorrow if pt continues to improve clinically and tolerates diet.

## 2023-08-30 NOTE — PROGRESS NOTE ADULT - SUBJECTIVE AND OBJECTIVE BOX
Name: GARDENIA HATHAWAY  Age: 68y  Gender: Female    Pt was seen and examined.   c/o:  feels better,     Allergies:  morphine (Unknown)      PHYSICAL EXAM:    Vitals:  T(C): 36.4 (08-30-23 @ 20:51), Max: 36.4 (08-30-23 @ 20:51)  HR: 70 (08-30-23 @ 20:51) (62 - 86)  BP: 180/89 (08-30-23 @ 20:51) (135/78 - 183/97)  RR: 17 (08-30-23 @ 20:51) (16 - 17)  SpO2: 97% (08-30-23 @ 20:51) (94% - 97%)  Wt(kg): --Vital Signs Last 24 Hrs  T(C): 36.4 (30 Aug 2023 20:51), Max: 36.4 (30 Aug 2023 20:51)  T(F): 97.6 (30 Aug 2023 20:51), Max: 97.6 (30 Aug 2023 20:51)  HR: 70 (30 Aug 2023 20:51) (62 - 86)  BP: 180/89 (30 Aug 2023 20:51) (135/78 - 183/97)  BP(mean): --  RR: 17 (30 Aug 2023 20:51) (16 - 17)  SpO2: 97% (30 Aug 2023 20:51) (94% - 97%)    Parameters below as of 30 Aug 2023 20:51  Patient On (Oxygen Delivery Method): room air          NECK: Supple, No JVD  CHEST/LUNG: CTA, B/L, No rales, rhonchi, wheezing, or rubs  HEART: S1,S2, N1 Regular rate and rhythm; No murmurs, rubs, or gallops  ABDOMEN: Soft, Nontender, Nondistended; Bowel sounds present  EXTREMITIES:  2+ Peripheral Pulses, No clubbing, cyanosis, or edema      LABS:                        14.3   7.24  )-----------( 229      ( 29 Aug 2023 06:43 )             42.8     08-29    140  |  105  |  7<L>  ----------------------------<  98  3.8   |  27  |  0.8    Ca    8.7      29 Aug 2023 06:43    TPro  5.7<L>  /  Alb  3.7  /  TBili  0.5  /  DBili  x   /  AST  12  /  ALT  8   /  AlkPhos  70  08-29    LIVER FUNCTIONS - ( 29 Aug 2023 06:43 )  Alb: 3.7 g/dL / Pro: 5.7 g/dL / ALK PHOS: 70 U/L / ALT: 8 U/L / AST: 12 U/L / GGT: x                 MEDICATIONS  (STANDING):  atenolol  Tablet 25 milliGRAM(s) Oral daily  ciprofloxacin   IVPB      ciprofloxacin   IVPB 400 milliGRAM(s) IV Intermittent every 12 hours  heparin   Injectable 5000 Unit(s) SubCutaneous every 12 hours  hydrOXYzine hydrochloride 25 milliGRAM(s) Oral once  lisinopril 40 milliGRAM(s) Oral daily  methylPREDNISolone sodium succinate Injectable 40 milliGRAM(s) IV Push two times a day  metroNIDAZOLE  IVPB      metroNIDAZOLE  IVPB 500 milliGRAM(s) IV Intermittent every 8 hours  NIFEdipine XL 30 milliGRAM(s) Oral at bedtime  sodium chloride 0.9%. 1000 milliLiter(s) (50 mL/Hr) IV Continuous <Continuous>        RADIOLOGY & ADDITIONAL TESTS:    Imaging Personally Reviewed:  [ ] YES  [ ] NO    A/P:  68 year old female presents to ed with abdominal pain and diarrhea x 2 days. The pain is diffuse to abdomen central to just right of the midline. She has a pmhx of Crohns and diverticulitis which she reports her last flare up of Crohn's was in July and was given ciprofloxacin and flagyl w/ relief, There is mild  nausea, negative vomiting, fever, chills, chest pain, sob, or urinary sys. The patient reports she passed watery stool (diarrhea) today. Her surgical history is significant for a bowel resection for her SBO.    Acute Diverticulitis and likely crohn`s flare up    #IV fluids 100ml/hr  #Crohn's flare up   #NPO  #Abx Cipro and flagyl    #GI consult - appreciated     # Give prophylaxis GI/VTE    pt clinically stable,   cont ivf for another 24 hrs and will d/c in am if tolerating diet.

## 2023-08-30 NOTE — PROGRESS NOTE ADULT - SUBJECTIVE AND OBJECTIVE BOX
S; feeling less distended this AM as she passed flatus and had BM last night. Still with unchanged focal, right sided abdominal ,mild pain.  Denies N/V. Feels hungry and wants to eat  O; Vital Signs Last 24 Hrs  T(C): 35.8 (30 Aug 2023 12:55), Max: 36.1 (29 Aug 2023 21:06)  T(F): 96.4 (30 Aug 2023 12:55), Max: 97 (29 Aug 2023 21:06)  HR: 64 (30 Aug 2023 12:55) (60 - 86)  BP: 135/78 (30 Aug 2023 12:55) (133/90 - 189/91)  BP(mean): --  RR: 16 (30 Aug 2023 12:55) (16 - 17)  SpO2: 94% (30 Aug 2023 12:55) (94% - 100%)    Parameters below as of 30 Aug 2023 12:55  Patient On (Oxygen Delivery Method): room air    MEDICATIONS  (STANDING):  atenolol  Tablet 25 milliGRAM(s) Oral daily  ciprofloxacin   IVPB      ciprofloxacin   IVPB 400 milliGRAM(s) IV Intermittent every 12 hours  heparin   Injectable 5000 Unit(s) SubCutaneous every 12 hours  lisinopril 40 milliGRAM(s) Oral daily  methylPREDNISolone sodium succinate Injectable 40 milliGRAM(s) IV Push two times a day  metroNIDAZOLE  IVPB      metroNIDAZOLE  IVPB 500 milliGRAM(s) IV Intermittent every 8 hours  NIFEdipine XL 30 milliGRAM(s) Oral at bedtime  sodium chloride 0.9%. 1000 milliLiter(s) (50 mL/Hr) IV Continuous <Continuous>    MEDICATIONS  (PRN):  acetaminophen     Tablet .. 650 milliGRAM(s) Oral every 6 hours PRN Temp greater or equal to 38C (100.4F), Mild Pain (1 - 3)  aluminum hydroxide/magnesium hydroxide/simethicone Suspension 30 milliLiter(s) Oral every 4 hours PRN Dyspepsia  busPIRone 5 milliGRAM(s) Oral two times a day PRN anxiety  ketorolac   Injectable 15 milliGRAM(s) IV Push every 6 hours PRN Severe Pain (7 - 10)  melatonin 3 milliGRAM(s) Oral at bedtime PRN Insomnia  ondansetron Injectable 4 milliGRAM(s) IV Push every 8 hours PRN Nausea and/or Vomiting    EXAM:  GEN; Well appearing  abd: soft, mildly distended , +mild TTP to right abdomen (kelly-umbilical), no rebound/guarding    Labs:  CAPILLARY BLOOD GLUCOSE                              14.3   7.24  )-----------( 229      ( 29 Aug 2023 06:43 )             42.8         08-29    140  |  105  |  7<L>  ----------------------------<  98  3.8   |  27  |  0.8          LFTs:             5.7  | 0.5  | 12       ------------------[70      ( 29 Aug 2023 06:43 )  3.7  | x    | 8           Lipase:x      Amylase:x         Lactate, Blood: 1.0 mmol/L (08-29-23 @ 06:43)  Blood Gas Venous - Lactate: 1.20 mmol/L (08-28-23 @ 17:43)      Coags:        Urinalysis Basic - ( 29 Aug 2023 06:43 )    Color: x / Appearance: x / SG: x / pH: x  Gluc: 98 mg/dL / Ketone: x  / Bili: x / Urobili: x   Blood: x / Protein: x / Nitrite: x   Leuk Esterase: x / RBC: x / WBC x   Sq Epi: x / Non Sq Epi: x / Bacteria: x        < from: Xray Abdomen 2 Views (08.30.23 @ 09:20) >  INTERPRETATION:  Clinical History / Reason for exam: Abdominal pain  2. Images  Previous exam May 28, 2023  The bowel gas pattern appears normal. No free air is seen.    IMPRESSION: Normal bowel gas pattern    < end of copied text >   69 y/o female with medical history of HTN, Everday smoker, Migraine HA's, and Crohn's disease who presents in the ER with 3 day history of Right sided abdominal pains     # Early PSBO,  R/O Crohn's exacerbation.   - no acute surgical intervention  -Abdominal xray w/normal bowel gas pattern  - may start clear liquids  - F/U GI consult for possible Crohn's exacerbation.  - serial abdominal exams  - Monitor daily labs.   -pt seen by Dr. Harrison S; feeling less distended this AM as she passed flatus and had BM last night. Still with unchanged focal, right sided abdominal ,mild pain.  Denies N/V. Feels hungry and wants to eat  O; Vital Signs Last 24 Hrs  T(C): 35.8 (30 Aug 2023 12:55), Max: 36.1 (29 Aug 2023 21:06)  T(F): 96.4 (30 Aug 2023 12:55), Max: 97 (29 Aug 2023 21:06)  HR: 64 (30 Aug 2023 12:55) (60 - 86)  BP: 135/78 (30 Aug 2023 12:55) (133/90 - 189/91)  BP(mean): --  RR: 16 (30 Aug 2023 12:55) (16 - 17)  SpO2: 94% (30 Aug 2023 12:55) (94% - 100%)    Parameters below as of 30 Aug 2023 12:55  Patient On (Oxygen Delivery Method): room air    MEDICATIONS  (STANDING):  atenolol  Tablet 25 milliGRAM(s) Oral daily  ciprofloxacin   IVPB      ciprofloxacin   IVPB 400 milliGRAM(s) IV Intermittent every 12 hours  heparin   Injectable 5000 Unit(s) SubCutaneous every 12 hours  lisinopril 40 milliGRAM(s) Oral daily  methylPREDNISolone sodium succinate Injectable 40 milliGRAM(s) IV Push two times a day  metroNIDAZOLE  IVPB      metroNIDAZOLE  IVPB 500 milliGRAM(s) IV Intermittent every 8 hours  NIFEdipine XL 30 milliGRAM(s) Oral at bedtime  sodium chloride 0.9%. 1000 milliLiter(s) (50 mL/Hr) IV Continuous <Continuous>    MEDICATIONS  (PRN):  acetaminophen     Tablet .. 650 milliGRAM(s) Oral every 6 hours PRN Temp greater or equal to 38C (100.4F), Mild Pain (1 - 3)  aluminum hydroxide/magnesium hydroxide/simethicone Suspension 30 milliLiter(s) Oral every 4 hours PRN Dyspepsia  busPIRone 5 milliGRAM(s) Oral two times a day PRN anxiety  ketorolac   Injectable 15 milliGRAM(s) IV Push every 6 hours PRN Severe Pain (7 - 10)  melatonin 3 milliGRAM(s) Oral at bedtime PRN Insomnia  ondansetron Injectable 4 milliGRAM(s) IV Push every 8 hours PRN Nausea and/or Vomiting    EXAM:  GEN; Well appearing  abd: soft, mildly distended , +mild TTP to right abdomen (kelly-umbilical), no rebound/guarding    Labs:  CAPILLARY BLOOD GLUCOSE                              14.3   7.24  )-----------( 229      ( 29 Aug 2023 06:43 )             42.8         08-29    140  |  105  |  7<L>  ----------------------------<  98  3.8   |  27  |  0.8          LFTs:             5.7  | 0.5  | 12       ------------------[70      ( 29 Aug 2023 06:43 )  3.7  | x    | 8           Lipase:x      Amylase:x         Lactate, Blood: 1.0 mmol/L (08-29-23 @ 06:43)  Blood Gas Venous - Lactate: 1.20 mmol/L (08-28-23 @ 17:43)      Coags:        Urinalysis Basic - ( 29 Aug 2023 06:43 )    Color: x / Appearance: x / SG: x / pH: x  Gluc: 98 mg/dL / Ketone: x  / Bili: x / Urobili: x   Blood: x / Protein: x / Nitrite: x   Leuk Esterase: x / RBC: x / WBC x   Sq Epi: x / Non Sq Epi: x / Bacteria: x        < from: Xray Abdomen 2 Views (08.30.23 @ 09:20) >  INTERPRETATION:  Clinical History / Reason for exam: Abdominal pain  2. Images  Previous exam May 28, 2023  The bowel gas pattern appears normal. No free air is seen.    IMPRESSION: Normal bowel gas pattern    < end of copied text >

## 2023-08-30 NOTE — PROGRESS NOTE ADULT - ASSESSMENT
68yFemale pmh HTN, Crohn's disease small bowel resection a year and a half ago, diagnosed with Crohn's disease 5-7 years ago not on any maintenance medications follows with Dr. Rodriguez. Patient presents for diffuse abdominal pain and nausea.      Problem 1-Abdominal pain  possible early SBO, etiology possibly to to Crohn's stricturing, active Crohn's disease   Status post distal small bowel resection. Fecalization of small bowel   enteric contents with mildly dilated distal small bowel. Transition point   just proximal to the small to large bowel anastomosis. Findings can be   seen with early obstruction, possibly secondary to inflammatory   changes/stricturing of the small bowel.  -patient having diarrhea  Rec  -ESR WNL  -x-ray WNL  -will follow clinical course to decide on steroids   -NG tube if N/v  -C-diff and GI PCR  -obtain obstructive series tomorrow  -fecal calprotectin  -patient scheduled for GI workup with Dr. Rodriguez in September, will need to consider biologics outpatient   -macho mendenhall   68yFemale pmh HTN, Crohn's disease small bowel resection a year and a half ago, diagnosed with Crohn's disease 5-7 years ago not on any maintenance medications follows with Dr. Rodriguez. Patient presents for diffuse abdominal pain and nausea.    Problem 1-Abdominal pain  possible early SBO, etiology possibly to Crohn's stricturing, active Crohn's disease   Status post distal small bowel resection. Fecalization of small bowel   enteric contents with mildly dilated distal small bowel. Transition point   just proximal to the small to large bowel anastomosis. Findings can be   seen with early obstruction, possibly secondary to inflammatory   changes/stricturing of the small bowel.  -patient having diarrhea  -x-ray-Normal bowel gas pattern  Rec  -ESR WNL  -x-ray WNL  -hold abx  -no need for steroids   -NG tube if N/v  -C-diff negative   -GI PCR  -diet as tolerated   -fecal calprotectin  -patient scheduled for GI workup with Dr. Rodriguez in September, will need to consider biologics outpatient

## 2023-08-30 NOTE — PROGRESS NOTE ADULT - ASSESSMENT
67 y/o female with medical history of HTN, Everday smoker, Migraine HA's, and Crohn's disease who presents in the ER with 3 day history of Right sided abdominal pains.    # Early PSBO,  R/O Crohn's exacerbation.   - no acute surgical intervention  -Abdominal xray w/normal bowel gas pattern  - may start clear liquids, progress diet as tolerated  - F/U GI consult for possible Crohn's exacerbation.  - serial abdominal exams  - Monitor daily labs.

## 2023-08-30 NOTE — PROGRESS NOTE ADULT - SUBJECTIVE AND OBJECTIVE BOX
Name: GARDENIA HATHAWAY  Age: 68y  Gender: Female    Pt was seen and examined.   c/o:    Allergies:  morphine (Unknown)      PHYSICAL EXAM:    Vitals:  T(C): 36.1 (08-29-23 @ 21:06), Max: 36.2 (08-29-23 @ 05:09)  HR: 70 (08-29-23 @ 23:49) (60 - 95)  BP: 176/90 (08-29-23 @ 23:49) (133/90 - 189/91)  RR: 16 (08-29-23 @ 23:49) (16 - 18)  SpO2: 100% (08-29-23 @ 21:06) (95% - 100%)  Wt(kg): --Vital Signs Last 24 Hrs  T(C): 36.1 (29 Aug 2023 21:06), Max: 36.2 (29 Aug 2023 05:09)  T(F): 97 (29 Aug 2023 21:06), Max: 97.1 (29 Aug 2023 05:09)  HR: 70 (29 Aug 2023 23:49) (60 - 95)  BP: 176/90 (29 Aug 2023 23:49) (133/90 - 189/91)  BP(mean): --  RR: 16 (29 Aug 2023 23:49) (16 - 18)  SpO2: 100% (29 Aug 2023 21:06) (95% - 100%)    Parameters below as of 29 Aug 2023 21:06  Patient On (Oxygen Delivery Method): room air          NECK: Supple, No JVD  CHEST/LUNG: CTA, B/L, No rales, rhonchi, wheezing, or rubs  HEART: S1,S2, N1 Regular rate and rhythm; No murmurs, rubs, or gallops  ABDOMEN: Soft, Nontender, Nondistended; Bowel sounds present  EXTREMITIES:  2+ Peripheral Pulses, No clubbing, cyanosis, or edema      LABS:                        14.3   7.24  )-----------( 229      ( 29 Aug 2023 06:43 )             42.8     08-29    140  |  105  |  7<L>  ----------------------------<  98  3.8   |  27  |  0.8    Ca    8.7      29 Aug 2023 06:43    TPro  5.7<L>  /  Alb  3.7  /  TBili  0.5  /  DBili  x   /  AST  12  /  ALT  8   /  AlkPhos  70  08-29    LIVER FUNCTIONS - ( 29 Aug 2023 06:43 )  Alb: 3.7 g/dL / Pro: 5.7 g/dL / ALK PHOS: 70 U/L / ALT: 8 U/L / AST: 12 U/L / GGT: x                 MEDICATIONS  (STANDING):  atenolol  Tablet 25 milliGRAM(s) Oral daily  ciprofloxacin   IVPB 400 milliGRAM(s) IV Intermittent every 12 hours  ciprofloxacin   IVPB      hydrALAZINE Injectable 10 milliGRAM(s) IV Push once  lisinopril 40 milliGRAM(s) Oral daily  metroNIDAZOLE  IVPB      metroNIDAZOLE  IVPB 500 milliGRAM(s) IV Intermittent every 8 hours  NIFEdipine XL 30 milliGRAM(s) Oral at bedtime  sodium chloride 0.9%. 1000 milliLiter(s) (50 mL/Hr) IV Continuous <Continuous>        RADIOLOGY & ADDITIONAL TESTS:    Imaging Personally Reviewed:  [ ] YES  [ ] NO    A/P:  68 year old female presents to ed with abdominal pain and diarrhea x 2 days. The pain is diffuse to abdomen central to just right of the midline. She has a pmhx of Crohns and diverticulitis which she reports her last flare up of Crohn's was in July and was given ciprofloxacin and flagyl w/ relief, There is mild  nausea, negative vomiting, fever, chills, chest pain, sob, or urinary sys. The patient reports she passed watery stool (diarrhea) today. Her surgical history is significant for a bowel resection for her SBO.    Acute Diverticulitis and likely crohn`s flare up    #IV fluids 100ml/hr  #Crohn's flare up   #NPO  #Abx Cipro and flagyl    #GI consult - appreciated     # Give prophylaxis GI/VTE    pt clinically stable,   cont ivf for another 24 hrs and will d/c

## 2023-08-31 ENCOUNTER — TRANSCRIPTION ENCOUNTER (OUTPATIENT)
Age: 68
End: 2023-08-31

## 2023-08-31 VITALS
SYSTOLIC BLOOD PRESSURE: 168 MMHG | TEMPERATURE: 97 F | RESPIRATION RATE: 18 BRPM | OXYGEN SATURATION: 96 % | DIASTOLIC BLOOD PRESSURE: 92 MMHG | HEART RATE: 73 BPM

## 2023-08-31 LAB
ANION GAP SERPL CALC-SCNC: 11 MMOL/L — SIGNIFICANT CHANGE UP (ref 7–14)
BUN SERPL-MCNC: 8 MG/DL — LOW (ref 10–20)
CALCIUM SERPL-MCNC: 9.4 MG/DL — SIGNIFICANT CHANGE UP (ref 8.4–10.5)
CHLORIDE SERPL-SCNC: 103 MMOL/L — SIGNIFICANT CHANGE UP (ref 98–110)
CO2 SERPL-SCNC: 25 MMOL/L — SIGNIFICANT CHANGE UP (ref 17–32)
CREAT SERPL-MCNC: 0.6 MG/DL — LOW (ref 0.7–1.5)
EGFR: 99 ML/MIN/1.73M2 — SIGNIFICANT CHANGE UP
GLUCOSE SERPL-MCNC: 136 MG/DL — HIGH (ref 70–99)
HCT VFR BLD CALC: 44.1 % — SIGNIFICANT CHANGE UP (ref 37–47)
HGB BLD-MCNC: 14.8 G/DL — SIGNIFICANT CHANGE UP (ref 12–16)
MCHC RBC-ENTMCNC: 30.9 PG — SIGNIFICANT CHANGE UP (ref 27–31)
MCHC RBC-ENTMCNC: 33.6 G/DL — SIGNIFICANT CHANGE UP (ref 32–37)
MCV RBC AUTO: 92.1 FL — SIGNIFICANT CHANGE UP (ref 81–99)
NRBC # BLD: 0 /100 WBCS — SIGNIFICANT CHANGE UP (ref 0–0)
PLATELET # BLD AUTO: 221 K/UL — SIGNIFICANT CHANGE UP (ref 130–400)
PMV BLD: 10.5 FL — HIGH (ref 7.4–10.4)
POTASSIUM SERPL-MCNC: 4.9 MMOL/L — SIGNIFICANT CHANGE UP (ref 3.5–5)
POTASSIUM SERPL-SCNC: 4.9 MMOL/L — SIGNIFICANT CHANGE UP (ref 3.5–5)
RBC # BLD: 4.79 M/UL — SIGNIFICANT CHANGE UP (ref 4.2–5.4)
RBC # FLD: 12.6 % — SIGNIFICANT CHANGE UP (ref 11.5–14.5)
SODIUM SERPL-SCNC: 139 MMOL/L — SIGNIFICANT CHANGE UP (ref 135–146)
WBC # BLD: 6.97 K/UL — SIGNIFICANT CHANGE UP (ref 4.8–10.8)
WBC # FLD AUTO: 6.97 K/UL — SIGNIFICANT CHANGE UP (ref 4.8–10.8)

## 2023-08-31 RX ORDER — METRONIDAZOLE 500 MG
1 TABLET ORAL
Qty: 15 | Refills: 0
Start: 2023-08-31 | End: 2023-09-04

## 2023-08-31 RX ORDER — CIPROFLOXACIN LACTATE 400MG/40ML
1 VIAL (ML) INTRAVENOUS
Qty: 10 | Refills: 0
Start: 2023-08-31 | End: 2023-09-04

## 2023-08-31 RX ADMIN — Medication 40 MILLIGRAM(S): at 05:05

## 2023-08-31 RX ADMIN — LISINOPRIL 40 MILLIGRAM(S): 2.5 TABLET ORAL at 05:05

## 2023-08-31 RX ADMIN — ATENOLOL 25 MILLIGRAM(S): 25 TABLET ORAL at 05:05

## 2023-08-31 NOTE — DISCHARGE NOTE PROVIDER - NSDCCPCAREPLAN_GEN_ALL_CORE_FT
PRINCIPAL DISCHARGE DIAGNOSIS  Diagnosis: Abdominal pain  Assessment and Plan of Treatment: acute diverticulitis vs crohn's flare  please finish the course of antibiotics and steroids as directed  prescriptions were sent to pharmacy  please follow up with Dr. Gomez on tue 9/5 between 3 and 5 pm  please follow up with Dr. Rodriguez in 1 week for reassessment  please stay on soft diet for now

## 2023-08-31 NOTE — DISCHARGE NOTE PROVIDER - NSDCMRMEDTOKEN_GEN_ALL_CORE_FT
atenolol 25 mg oral tablet: 1 tab(s) orally once a day  Cipro 500 mg oral tablet: 1 tab(s) orally 2 times a day  lisinopril 40 mg oral tablet: 1 orally 2 times a day  metroNIDAZOLE 500 mg oral tablet: 1 tab(s) orally every 8 hours  predniSONE 20 mg oral tablet: 1 tab(s) orally once a day

## 2023-08-31 NOTE — DISCHARGE NOTE PROVIDER - CARE PROVIDER_API CALL
Nura Gomez  Internal Medicine  [address] [place] [address]  Phone: [phone]  Fax: [phone]  Established Patient  Scheduled Appointment: 09/05/2023 03:00 PM    Batsheva Rodriguez  Gastroenterology  [address] [place] [address]  Phone: [phone]  Fax: [phone]  Follow Up Time: 1 week

## 2023-08-31 NOTE — PROGRESS NOTE ADULT - SUBJECTIVE AND OBJECTIVE BOX
DIAGNOSIS:   HOSPITAL DAY #:    STATUS POST:    POST OPERATIVE DAY #:     Vital Signs Last 24 Hrs  T(C): 36 (31 Aug 2023 04:07), Max: 36 (31 Aug 2023 04:07)  T(F): 96.8 (31 Aug 2023 04:07), Max: 96.8 (31 Aug 2023 04:07)  HR: 73 (31 Aug 2023 04:07) (73 - 73)  BP: 168/92 (31 Aug 2023 04:07) (168/92 - 168/92)  BP(mean): --  RR: 18 (31 Aug 2023 04:07) (18 - 18)  SpO2: 96% (31 Aug 2023 04:07) (96% - 96%)        SUBJECTIVE: Pt seen    Pain: YES  [ ]   NO [ ]   Nausea: [ ] YES [ ] NO           Vomiting: [ ] YES [x ] NO  Flatus: [ ] YES [ ] NO             Bowel Movement: [x ] YES [ ] NO     Void: [ ]YES [ ]No      CASH DRAINAGE: SIGNIFICANT [ ]   NOT SIGNIFICANT [ ]   NOT APPLICABLE [ ]  YES [ ] NO    General Appearance: Appears well, NAD  Neck: Supple  Chest: Equal expansion bilaterally, equal breath sounds  CV: Pulse regular presently  Abdomen: Soft [x ] YES [ ]NO  DISTENDED [ ] YES [x ] NO TENDERNESS [ ]YES [x ]NO  INCISIONS: HEALING WELL [ ] YES  [ ] NO ERYTHEMA [ ] YES [ ] NO DRAINAGE [ ] YES  [ ] NO  Extremities: Grossly symmetric, CALF TENDERNESS [ ] YES  [ ] NO      LABS:                        14.8   6.97  )-----------( 221      ( 31 Aug 2023 07:31 )             44.1     08-31    139  |  103  |  8<L>  ----------------------------<  136<H>  4.9   |  25  |  0.6<L>    Ca    9.4      31 Aug 2023 07:31              ASSESSMENT:     GOOD POST OP COURSE [ ]  YES  [ ] NO  CONDITION IMPROVING  []  YES  [ ]  NO          PLAN:    CONTINUE PRESENT MANAGEMENT  [ ] YES  [ ] NO

## 2023-08-31 NOTE — DISCHARGE NOTE PROVIDER - HOSPITAL COURSE
68 year old female presents to ed with abdominal pain and diarrhea x 2 days. The pain is diffuse to abdomen central to just right of the midline. She has a pmhx of Crohns and diverticulitis which she reports her last flare up of Crohn's was in July and was given ciprofloxacin and flagyl w/ relief, There is mild  nausea, negative vomiting, fever, chills, chest pain, sob, or urinary sys. The patient reports she passed watery stool (diarrhea) today. Her surgical history is significant for a bowel resection for her SBO.    Pt was admitted to medicine for Acute Diverticulitis and likely Crohn's flare up  Treated with IV Cipro and Flagyl, IVF hydration. Diet slowly advanced. Pt was also started on IV steroids.   Surgery and GI consulted.   Pt's symptoms improved and pt wants to go home today.  Pt will be discharged on po cipro/flagyl and po prednisone x 5 days. Pt will follow up with Dr. Gomez on 9/5 and GI in 1 week.

## 2023-08-31 NOTE — CHART NOTE - NSCHARTNOTEFT_GEN_A_CORE
PA notified to discharge patient as per attending.  Case discussed with Dr. Gomez.  Patient stable, no changes.  Patient agreeable to discharge.     T(C): 36 (08-31-23 @ 04:07), Max: 36.4 (08-30-23 @ 20:51)  HR: 73 (08-31-23 @ 04:07) (64 - 73)  BP: 168/92 (08-31-23 @ 04:07) (135/78 - 180/89)  RR: 18 (08-31-23 @ 04:07) (16 - 18)  SpO2: 96% (08-31-23 @ 04:07) (94% - 97%)    Patient discharged home.   RN aware and will manage.

## 2023-08-31 NOTE — DISCHARGE NOTE NURSING/CASE MANAGEMENT/SOCIAL WORK - PATIENT PORTAL LINK FT
You can access the FollowMyHealth Patient Portal offered by [facility] by registering at the following website: [email or web address]. By joining [employer]’s FollowMyHealth portal, you will also be able to view your health information using other applications (apps) compatible with our system.

## 2023-08-31 NOTE — DISCHARGE NOTE PROVIDER - PROVIDER TOKENS
PROVIDER:[TOKEN:[[id number]],SCHEDULEDAPPT:[09/05/2023],SCHEDULEDAPPTTIME:[03:00 PM],ESTABLISHEDPATIENT:[T]],PROVIDER:[TOKEN:[[id number]],FOLLOWUP:[1 week]]

## 2023-09-05 DIAGNOSIS — K57.92 DIVERTICULITIS OF INTESTINE, PART UNSPECIFIED, WITHOUT PERFORATION OR ABSCESS WITHOUT BLEEDING: ICD-10-CM

## 2023-09-05 DIAGNOSIS — K50.912 CROHN'S DISEASE, UNSPECIFIED, WITH INTESTINAL OBSTRUCTION: ICD-10-CM

## 2023-09-05 DIAGNOSIS — I10 ESSENTIAL (PRIMARY) HYPERTENSION: ICD-10-CM

## 2023-09-05 DIAGNOSIS — G43.909 MIGRAINE, UNSPECIFIED, NOT INTRACTABLE, WITHOUT STATUS MIGRAINOSUS: ICD-10-CM

## 2023-09-05 DIAGNOSIS — F17.200 NICOTINE DEPENDENCE, UNSPECIFIED, UNCOMPLICATED: ICD-10-CM

## 2023-09-05 LAB — CALPROTECTIN STL-MCNT: 335 UG/G — HIGH (ref 0–120)

## 2024-04-20 ENCOUNTER — APPOINTMENT (OUTPATIENT)
Dept: ORTHOPEDIC SURGERY | Facility: CLINIC | Age: 69
End: 2024-04-20
Payer: MEDICARE

## 2024-04-20 VITALS — WEIGHT: 148 LBS | HEIGHT: 66 IN | BODY MASS INDEX: 23.78 KG/M2

## 2024-04-20 DIAGNOSIS — M25.561 PAIN IN RIGHT KNEE: ICD-10-CM

## 2024-04-20 PROBLEM — Z00.00 ENCOUNTER FOR PREVENTIVE HEALTH EXAMINATION: Status: ACTIVE | Noted: 2024-04-20

## 2024-04-20 PROBLEM — K56.609 UNSPECIFIED INTESTINAL OBSTRUCTION, UNSPECIFIED AS TO PARTIAL VERSUS COMPLETE OBSTRUCTION: Chronic | Status: ACTIVE | Noted: 2023-08-28

## 2024-04-20 PROBLEM — K50.90 CROHN'S DISEASE, UNSPECIFIED, WITHOUT COMPLICATIONS: Chronic | Status: ACTIVE | Noted: 2023-08-28

## 2024-04-20 PROCEDURE — 20610 DRAIN/INJ JOINT/BURSA W/O US: CPT | Mod: RT

## 2024-04-20 PROCEDURE — 99203 OFFICE O/P NEW LOW 30 MIN: CPT | Mod: 25

## 2024-04-20 PROCEDURE — 73562 X-RAY EXAM OF KNEE 3: CPT | Mod: RT

## 2024-04-20 RX ORDER — MELOXICAM 15 MG/1
15 TABLET ORAL
Qty: 30 | Refills: 0 | Status: ACTIVE | COMMUNITY
Start: 2024-04-20 | End: 1900-01-01

## 2024-04-20 NOTE — DISCUSSION/SUMMARY
[de-identified] : Impression: Right knee pain, joint effusion, possibly due to meniscal tear.  Plan: Patient was advised for knee aspiration, patient kindly declined, at this time I offered a cortisone injection, patient agrees. Risk and benefits of cortisone injection were discussed with the patient, after verbal consent knee was prepped with alcohol and Betadine. Cortisone injection to the Right knee was given using a sterile technique. Ethyl chloride was used as an anesthetic.   Injection was given using the inferior lateral portal. 10 mg of dexamethasone and 50 mg of 2% lidocaine were given. Patient tolerated injection well. Patient was advised that if any redness, or increased pain to give a call to the office. Patient is aware that on the 3 cortisone injections a year are allowed.  Advised for physical therapy, advised for compression with a knee brace. Advised for anti-inflammatories, since she has Crohn's disease she voiced to take NSAIDs, prescription for meloxicam for severe pain and to take it as needed was sent to the pharmacy, patient agrees.  Follow-up: 4 weeks for repeat evaluation if needed.

## 2024-04-20 NOTE — HISTORY OF PRESENT ILLNESS
[de-identified] : 68-year-old female here for evaluation of pain to Dr. Malave patient states that this pain is started on about April 12, 2024. Patient states that few weeks ago she was cleaning her floors, she was kneeling and when she got up, she felt pain on her right knee, patient states that she is feeling pain over the posterior aspect of the knee and crepitus and pain with twisting and squatting. Patient states that as her knee was feeling better she clean her floors again, and this time the pain was worse, and she is unable to squat and she has severe pain with twisting motion of the knee.  Patient also complains of some swelling of the knee.  Past medical history Crohn's disease and hypertension.   Current medication of Tylenol, lisinopril, atenolol and amlodipine. Patient denies any allergy to medication.

## 2024-04-20 NOTE — IMAGING
[de-identified] : Examination of  right  knee:   Swelling - Positive Ecchymosis - Negative Effusion -  Positive Tenderness over minimal tenderness over suprapatella bursa and posterior aspect of the knee Karl's - Equivocal Motor strength - 5/5 Range of motion mild stiffnes Laxity - Negative Active straight leg raise - able Anterior drawer - Negative Calf soft nontender. Neurovascular intact. Gait - normal Patella maltracking - Negative     Three-view radiographs of the right knee, Were negative for any acute fracture or dislocation.  No bony abnormality observed. Mild degenerative changes

## 2024-05-17 ENCOUNTER — APPOINTMENT (OUTPATIENT)
Dept: ORTHOPEDIC SURGERY | Facility: CLINIC | Age: 69
End: 2024-05-17

## 2024-08-14 ENCOUNTER — NON-APPOINTMENT (OUTPATIENT)
Age: 69
End: 2024-08-14

## 2024-08-16 ENCOUNTER — APPOINTMENT (OUTPATIENT)
Dept: OBGYN | Facility: CLINIC | Age: 69
End: 2024-08-16
Payer: MEDICARE

## 2024-08-16 VITALS
SYSTOLIC BLOOD PRESSURE: 159 MMHG | BODY MASS INDEX: 23.78 KG/M2 | HEART RATE: 71 BPM | HEIGHT: 66 IN | WEIGHT: 148 LBS | DIASTOLIC BLOOD PRESSURE: 107 MMHG

## 2024-08-16 DIAGNOSIS — K50.918 CROHN'S DISEASE, UNSPECIFIED, WITH OTHER COMPLICATION: ICD-10-CM

## 2024-08-16 DIAGNOSIS — F17.200 NICOTINE DEPENDENCE, UNSPECIFIED, UNCOMPLICATED: ICD-10-CM

## 2024-08-16 DIAGNOSIS — N89.8 OTHER SPECIFIED NONINFLAMMATORY DISORDERS OF VAGINA: ICD-10-CM

## 2024-08-16 DIAGNOSIS — R92.30 DENSE BREASTS, UNSPECIFIED: ICD-10-CM

## 2024-08-16 PROCEDURE — 99459 PELVIC EXAMINATION: CPT

## 2024-08-16 PROCEDURE — 99203 OFFICE O/P NEW LOW 30 MIN: CPT

## 2024-08-16 RX ORDER — LISINOPRIL 40 MG/1
40 TABLET ORAL
Refills: 0 | Status: ACTIVE | COMMUNITY

## 2024-08-16 RX ORDER — ATENOLOL 25 MG/1
25 TABLET ORAL
Refills: 0 | Status: ACTIVE | COMMUNITY

## 2024-08-16 RX ORDER — AMLODIPINE BESYLATE 5 MG/1
5 TABLET ORAL
Refills: 0 | Status: ACTIVE | COMMUNITY

## 2024-08-16 NOTE — HISTORY OF PRESENT ILLNESS
[FreeTextEntry1] : last visit alejandra me 20 years ago hx of Crohns and partial bowel resection...double....no bag .....3 's....I delivered her last one 31 years ago  hx of BV paps with her primary all these years

## 2024-08-16 NOTE — PHYSICAL EXAM
[Chaperone Present] : A chaperone was present in the examining room during all aspects of the physical examination [[id number]] : A chaperone was present during the pelvic exam. [Labia Majora] : normal [Labia Minora] : normal [Discharge] : discharge [Scant] : scant [Thin] : thin [Normal] : normal [Uterine Adnexae] : normal

## 2024-08-19 ENCOUNTER — NON-APPOINTMENT (OUTPATIENT)
Age: 69
End: 2024-08-19

## 2024-08-20 NOTE — ED ADULT NURSE NOTE - TEMPERATURE IN CELSIUS (DEGREES C)
Consultation - Nephrology   Austin Carter Regine Lobato MD  Reason for Consult: HILDA    Austin Carter HILDA (acute kidney injury) ([facility]) 01/22/2018    Allergic     Anxiety     Arrhythmia     Arthritis     Atrial fibrillation ([facility])     Bradycardia     has pacemaker    Cancer ([facility])     Lorenzo Pak MD;  Location: [facility] MAIN OR;  Service: Pain Management     EPIDURAL BLOCK INJECTION N/A 5/31/2018    Procedure: L4 L5 Lumbar Epidural Steroid Injection;  Surgeon: Lorenzo Pak MD;  Location: [facility] [id number]);  Surgeon: Lorenzo Pak MD;  Location: [facility] 36.5

## 2024-08-21 ENCOUNTER — NON-APPOINTMENT (OUTPATIENT)
Age: 69
End: 2024-08-21

## 2024-09-13 ENCOUNTER — EMERGENCY (EMERGENCY)
Facility: HOSPITAL | Age: 69
LOS: 0 days | Discharge: HOME | End: 2024-09-13
Attending: EMERGENCY MEDICINE
Payer: MEDICARE

## 2024-09-13 VITALS
TEMPERATURE: 98 F | WEIGHT: 164.91 LBS | DIASTOLIC BLOOD PRESSURE: 112 MMHG | RESPIRATION RATE: 16 BRPM | SYSTOLIC BLOOD PRESSURE: 187 MMHG | HEART RATE: 74 BPM | OXYGEN SATURATION: 97 %

## 2024-09-13 VITALS
HEART RATE: 69 BPM | OXYGEN SATURATION: 98 % | DIASTOLIC BLOOD PRESSURE: 91 MMHG | RESPIRATION RATE: 18 BRPM | SYSTOLIC BLOOD PRESSURE: 160 MMHG | TEMPERATURE: 98 F

## 2024-09-13 DIAGNOSIS — Z90.49 ACQUIRED ABSENCE OF OTHER SPECIFIED PARTS OF DIGESTIVE TRACT: Chronic | ICD-10-CM

## 2024-09-13 LAB
ALBUMIN SERPL ELPH-MCNC: 4.3 G/DL — SIGNIFICANT CHANGE UP (ref 3.5–5.2)
ALP SERPL-CCNC: 75 U/L — SIGNIFICANT CHANGE UP (ref 30–115)
ALT FLD-CCNC: 11 U/L — SIGNIFICANT CHANGE UP (ref 0–41)
ANION GAP SERPL CALC-SCNC: 11 MMOL/L — SIGNIFICANT CHANGE UP (ref 7–14)
AST SERPL-CCNC: 16 U/L — SIGNIFICANT CHANGE UP (ref 0–41)
BASOPHILS # BLD AUTO: 0.04 K/UL — SIGNIFICANT CHANGE UP (ref 0–0.2)
BASOPHILS NFR BLD AUTO: 0.5 % — SIGNIFICANT CHANGE UP (ref 0–1)
BILIRUB SERPL-MCNC: 0.3 MG/DL — SIGNIFICANT CHANGE UP (ref 0.2–1.2)
BUN SERPL-MCNC: 13 MG/DL — SIGNIFICANT CHANGE UP (ref 10–20)
CALCIUM SERPL-MCNC: 9.6 MG/DL — SIGNIFICANT CHANGE UP (ref 8.4–10.5)
CHLORIDE SERPL-SCNC: 99 MMOL/L — SIGNIFICANT CHANGE UP (ref 98–110)
CO2 SERPL-SCNC: 27 MMOL/L — SIGNIFICANT CHANGE UP (ref 17–32)
CREAT SERPL-MCNC: 0.9 MG/DL — SIGNIFICANT CHANGE UP (ref 0.7–1.5)
EGFR: 70 ML/MIN/1.73M2 — SIGNIFICANT CHANGE UP
EOSINOPHIL # BLD AUTO: 0.13 K/UL — SIGNIFICANT CHANGE UP (ref 0–0.7)
EOSINOPHIL NFR BLD AUTO: 1.7 % — SIGNIFICANT CHANGE UP (ref 0–8)
GLUCOSE SERPL-MCNC: 96 MG/DL — SIGNIFICANT CHANGE UP (ref 70–99)
HCT VFR BLD CALC: 43.3 % — SIGNIFICANT CHANGE UP (ref 37–47)
HGB BLD-MCNC: 15 G/DL — SIGNIFICANT CHANGE UP (ref 12–16)
IMM GRANULOCYTES NFR BLD AUTO: 0.3 % — SIGNIFICANT CHANGE UP (ref 0.1–0.3)
LYMPHOCYTES # BLD AUTO: 1.94 K/UL — SIGNIFICANT CHANGE UP (ref 1.2–3.4)
LYMPHOCYTES # BLD AUTO: 25.6 % — SIGNIFICANT CHANGE UP (ref 20.5–51.1)
MAGNESIUM SERPL-MCNC: 1.8 MG/DL — SIGNIFICANT CHANGE UP (ref 1.8–2.4)
MCHC RBC-ENTMCNC: 32.1 PG — HIGH (ref 27–31)
MCHC RBC-ENTMCNC: 34.6 G/DL — SIGNIFICANT CHANGE UP (ref 32–37)
MCV RBC AUTO: 92.7 FL — SIGNIFICANT CHANGE UP (ref 81–99)
MONOCYTES # BLD AUTO: 0.47 K/UL — SIGNIFICANT CHANGE UP (ref 0.1–0.6)
MONOCYTES NFR BLD AUTO: 6.2 % — SIGNIFICANT CHANGE UP (ref 1.7–9.3)
NEUTROPHILS # BLD AUTO: 4.98 K/UL — SIGNIFICANT CHANGE UP (ref 1.4–6.5)
NEUTROPHILS NFR BLD AUTO: 65.7 % — SIGNIFICANT CHANGE UP (ref 42.2–75.2)
NRBC # BLD: 0 /100 WBCS — SIGNIFICANT CHANGE UP (ref 0–0)
PLATELET # BLD AUTO: 250 K/UL — SIGNIFICANT CHANGE UP (ref 130–400)
PMV BLD: 9.7 FL — SIGNIFICANT CHANGE UP (ref 7.4–10.4)
POTASSIUM SERPL-MCNC: 4.6 MMOL/L — SIGNIFICANT CHANGE UP (ref 3.5–5)
POTASSIUM SERPL-SCNC: 4.6 MMOL/L — SIGNIFICANT CHANGE UP (ref 3.5–5)
PROT SERPL-MCNC: 6.8 G/DL — SIGNIFICANT CHANGE UP (ref 6–8)
RBC # BLD: 4.67 M/UL — SIGNIFICANT CHANGE UP (ref 4.2–5.4)
RBC # FLD: 12.5 % — SIGNIFICANT CHANGE UP (ref 11.5–14.5)
SODIUM SERPL-SCNC: 137 MMOL/L — SIGNIFICANT CHANGE UP (ref 135–146)
WBC # BLD: 7.58 K/UL — SIGNIFICANT CHANGE UP (ref 4.8–10.8)
WBC # FLD AUTO: 7.58 K/UL — SIGNIFICANT CHANGE UP (ref 4.8–10.8)

## 2024-09-13 PROCEDURE — 99285 EMERGENCY DEPT VISIT HI MDM: CPT

## 2024-09-13 PROCEDURE — 93010 ELECTROCARDIOGRAM REPORT: CPT

## 2024-09-13 PROCEDURE — 70450 CT HEAD/BRAIN W/O DYE: CPT | Mod: 26,MC

## 2024-09-13 PROCEDURE — 70450 CT HEAD/BRAIN W/O DYE: CPT | Mod: MC

## 2024-09-13 PROCEDURE — 99285 EMERGENCY DEPT VISIT HI MDM: CPT | Mod: 25

## 2024-09-13 PROCEDURE — 36415 COLL VENOUS BLD VENIPUNCTURE: CPT

## 2024-09-13 PROCEDURE — 82962 GLUCOSE BLOOD TEST: CPT

## 2024-09-13 PROCEDURE — 80053 COMPREHEN METABOLIC PANEL: CPT

## 2024-09-13 PROCEDURE — 83735 ASSAY OF MAGNESIUM: CPT

## 2024-09-13 PROCEDURE — 93005 ELECTROCARDIOGRAM TRACING: CPT

## 2024-09-13 PROCEDURE — 85025 COMPLETE CBC W/AUTO DIFF WBC: CPT

## 2024-09-13 PROCEDURE — 36000 PLACE NEEDLE IN VEIN: CPT

## 2024-09-13 RX ORDER — MECLIZINE HYDROCHLORIDE 25 MG/1
50 TABLET ORAL ONCE
Refills: 0 | Status: COMPLETED | OUTPATIENT
Start: 2024-09-13 | End: 2024-09-13

## 2024-09-13 RX ORDER — ONDANSETRON 2 MG/ML
4 INJECTION, SOLUTION INTRAMUSCULAR; INTRAVENOUS ONCE
Refills: 0 | Status: COMPLETED | OUTPATIENT
Start: 2024-09-13 | End: 2024-09-13

## 2024-09-13 RX ORDER — MECLIZINE HYDROCHLORIDE 25 MG/1
1 TABLET ORAL
Qty: 15 | Refills: 0
Start: 2024-09-13 | End: 2024-09-17

## 2024-09-13 RX ADMIN — Medication 1000 MILLILITER(S): at 15:03

## 2024-09-13 RX ADMIN — MECLIZINE HYDROCHLORIDE 50 MILLIGRAM(S): 25 TABLET ORAL at 15:04

## 2024-09-13 NOTE — ED ADULT NURSE NOTE - NS ED NURSE LEVEL OF CONSCIOUSNESS MENTAL STATUS
Provider, MD   metoprolol (LOPRESSOR) 25 MG tablet Take 25 mg by mouth 2 times daily Morning and evening    Historical Provider, MD   albuterol (PROVENTIL HFA;VENTOLIN HFA) 108 (90 BASE) MCG/ACT inhaler Inhale 2 puffs into the lungs every 6 hours as needed for Wheezing or Shortness of Breath. 9/8/14   Mukund Jauregui DO       Current medications:    No current facility-administered medications for this visit. No current outpatient medications on file. Facility-Administered Medications Ordered in Other Visits   Medication Dose Route Frequency Provider Last Rate Last Dose    0.9 % sodium chloride infusion   Intravenous Continuous EDWARD Shearer        sodium chloride flush 0.9 % injection 10 mL  10 mL Intravenous 2 times per day EDWARD Frank        sodium chloride flush 0.9 % injection 10 mL  10 mL Intravenous PRN EDWARD Frank        ceFAZolin (ANCEF) 2 g in dextrose 5 % 50 mL IVPB  2 g Intravenous On Call to [address]           Allergies: Allergies   Allergen Reactions    Bactrim      Stiff neck    Other      Cough medication.  Codeine she thinks causes facial swelling       Problem List:    Patient Active Problem List   Diagnosis Code    HTN (hypertension) I10    Tobacco abuse- quit smoking 2011 Z72.0    Acute neck pain M54.2    Obesity E66.9    Otalgia of right ear H92.01    Right lower quadrant abdominal pain R10.31    Pharyngoesophageal dysphagia R13.14    Family history of colon cancer in mother [de-identified]    Morbid obesity due to excess calories ([facility] [address] E66.01    GERD (gastroesophageal reflux disease) K21.9    S/P cardiac cath 6/29/2016- Angiographically Patent Coronaries, edp 10 mmhg, ef 65%- med rx Z98.890    Right upper quadrant pain R10.11    CAD (coronary artery disease) I25.10    Diabetes ([address] E11.9    Hyperlipidemia E78.5    Prolonged emergence from general anesthesia T88.59XA    Spinal stenosis M48.00       Past Medical History:        Diagnosis Date    CAD (coronary artery disease)     Chronic back pain     Diabetes ([facility] [address]     HgA1c 7.1 2019    GERD (gastroesophageal reflux disease)     Helicobacter pylori (H. pylori)     Hyperlipidemia     Hypertension     Liu syndrome 2016    Pneumohemothorax 2012    chest tube - spontaneous    Prolonged emergence from general anesthesia        Past Surgical History:        Procedure Laterality Date    CARDIAC CATHETERIZATION  2016    Dr. Iris Moser    spontaneous pneumothorax    CHOLECYSTECTOMY, LAPAROSCOPIC  2016    Dr. Joann Higgins  05/10/2016    Dr. Kole Snider  2016    Robotic Assisted Laparoscopic - Dr. Larry López N/A 10/27/2017    DIAGNOSTIC LAPAROSCOPY, APPENDECTOMY, EXCISION LOWER RIGHT ABDOMINAL MASS (SMALL) performed by Dara Lindsay MD at [address] [place] [address] 3/1/2019    L2-5 DECOMPRESSION L2-5 POSTERIOR FUSION performed by Alda Moore MD at [address] [facility]  2009    Perianal abscess    OTHER SURGICAL HISTORY  2009    anal fistula    UPPER GASTROINTESTINAL ENDOSCOPY  05/10/2016 06/21/19    /[facility] hospital       Social History:    Social History     Tobacco Use    Smoking status: Former Smoker     Packs/day: 1.00     Years: 20.00     Pack years: 20.00     Last attempt to quit: 2012     Years since quittin.4    Smokeless tobacco: Never Used   Substance Use Topics    Alcohol use: Not Currently     Comment: rarely                                Counseling given: Not Answered      Vital Signs (Current): There were no vitals filed for this visit.                                            BP Readings from Last 3 Encounters:   01/10/20 134/76   19 138/87   19 131/71       NPO Status:                                                                                 BMI:   Wt Readings from Last 3 Encounters:   01/10/20 240 lb 4.8 oz (109 kg) 12/31/19 244 lb 11.4 oz (111 kg)   12/16/19 242 lb (109.8 kg)     There is no height or weight on file to calculate BMI.    CBC:   Lab Results   Component Value Date    WBC 6.5 12/31/2019    RBC 4.32 12/31/2019    HGB 13.6 12/31/2019    HCT 40.4 12/31/2019    MCV 93.5 12/31/2019    RDW 12.4 05/20/2018     12/31/2019       CMP:   Lab Results   Component Value Date     12/31/2019    K 4.3 12/31/2019    K 3.5 10/03/2019     12/31/2019    CO2 26 12/31/2019    BUN 12 12/31/2019    CREATININE 0.6 12/31/2019    LABGLOM >90 12/31/2019    GLUCOSE 129 12/31/2019    PROT 7.4 12/08/2019    CALCIUM 10.0 12/31/2019    BILITOT 0.2 12/08/2019    ALKPHOS 97 12/08/2019    AST 20 12/08/2019    ALT 28 12/08/2019       POC Tests: No results for input(s): POCGLU, POCNA, POCK, POCCL, POCBUN, POCHEMO, POCHCT in the last 72 hours.     Coags:   Lab Results   Component Value Date    INR 0.95 11/19/2019    APTT 32.8 10/02/2019       HCG (If Applicable):   Lab Results   Component Value Date    PREGTESTUR NEGATIVE 08/22/2016    PREGSERUM NEGATIVE 06/29/2016        ABGs: No results found for: PHART, PO2ART, VID7HAT, MXO2PTA, BEART, H2VNTXKG     Type & Screen (If Applicable):  Lab Results   Component Value Date    [facility] POS 03/01/2019       Anesthesia Evaluation  Patient summary reviewed and Nursing notes reviewed  Airway: Mallampati: II  TM distance: >3 FB   Neck ROM: full  Mouth opening: > = 3 FB Dental:          Pulmonary: breath sounds clear to auscultation                            ROS comment: Former smoker   Cardiovascular:  Exercise tolerance: good (>4 METS),   (+) hypertension:, CAD:,       ECG reviewed  Rhythm: regular  Rate: normal  Echocardiogram reviewed                  Neuro/Psych:               GI/Hepatic/Renal:   (+) GERD:, morbid obesity          Endo/Other:    (+) Diabeteswell controlled, , .                 Abdominal:   (+) obese,         Vascular:                                          Anesthesia Plan      general     ASA 3       Induction: intravenous. MIPS: Postoperative opioids intended and Prophylactic antiemetics administered. Anesthetic plan and risks discussed with patient and spouse.       Plan discussed with surgical team.                  Larisa Azevedo MD   1/10/2020 Awake

## 2024-09-13 NOTE — ED PROVIDER NOTE - PROGRESS NOTE DETAILS
Patient symptoms resolved ambulating about ED without difficulty, will discharge with meclizine and follow-up PMD, counseled guarding conditions which should prompt return.

## 2024-09-13 NOTE — ED PROVIDER NOTE - CLINICAL SUMMARY MEDICAL DECISION MAKING FREE TEXT BOX
Patient with waxing waning vertigo symptoms, exam as above, lap sites appreciated, patient feels much better and is eager for discharge, aware of limitations of today's workup, will discharge with prescription for meclizine and outpatient follow-up, counseled regarding conditions which should prompt return.

## 2024-09-13 NOTE — ED ADULT TRIAGE NOTE - PRO INTERPRETER NEED 2
PATIENT INSTRUCTIONS    Treatment:    Occupational Therapy : Start Occupational Therapy (Hand/Upper extremity therapy).   This is offered at the [facility] location ([address] [place] [address]).  That department will call you to schedule after verifying your insurance benefits.  If they do not call you within 2 business days to schedule, please call them at [phone].     Follow-Up:    Please make an appointment with Dr. Inder Seth in 2 months.      Time left: 3/20/2024 12:05 PM     Please note: 24 hour notice for cancellation of appointment is required.    You may receive a survey in the mail, or via the e-mail address that you have provided.  We would appreciate if you could fill out the survey and provide us with any feedback on your experience regarding your visit today. Thank you for allowing us to provide you with your health care needs.     Do not hesitate to call if you are experiencing severe pain, worsening or change in your pain, have symptoms of infection (fever, warmth, redness, increased drainage), or have any other problem that concerns you ~ [phone] (or [phone] after hours).    Please remember when requesting refills on pain medication that the request should be made by Thursday at the latest. [facility] is open Monday-Friday, 8am-5pm, and closed on the weekends.  No narcotic refills will be filled after hours.    Additional Educational Resources:  For additional resources regarding your symptoms, diagnosis, or further health information, please visit the Health Resources section on [email or web address] or the Online Health Resources section in [facility].    
English

## 2024-09-13 NOTE — ED PROVIDER NOTE - PATIENT PORTAL LINK FT
You can access the FollowMyHealth Patient Portal offered by [facility] by registering at the following website: [email or web address]. By joining [employer]’s FollowMyHealth portal, you will also be able to view your health information using other applications (apps) compatible with our system.

## 2024-09-13 NOTE — ED PROVIDER NOTE - PHYSICAL EXAMINATION
Vital Signs: I have reviewed the initial vital signs.  Constitutional: well-nourished, appears stated age, no acute distress  HEENT: NC/AT, PERRLA, EOMI no nystagmus, conjunctivae pink, sclerae anicteric, mmm, oropharynx clear, TMs normal  Neck: supple,  no goiter, no meningismus  Cardiovascular: RRR no m/r/g  Respiratory: CTA no w/r/r  Gastrointestinal: +bs, snt/nd  Musculoskeletal: FROM x 4 no c/c/e, pulses equal calves nontender  Integumentary: warm, dry, no rash  Neurologic: awake, alert, cranial nerves II-XII grossly intact, extremities’ motor and sensory functions grossly intact, normal cerebellar, neg Romberg, gait steady  Psychiatric: appropriate mood, appropriate affect

## 2024-09-13 NOTE — ED PROVIDER NOTE - CARE PROVIDER_API CALL
Nura Gomez  Internal Medicine  [address] [place] [address]  Phone: [phone]  Fax: [phone]  Follow Up Time:

## 2024-09-13 NOTE — ED PROVIDER NOTE - OBJECTIVE STATEMENT
69-year-old female smoker history of hypertension on 3 agents with poorly controlled blood pressure, Crohn's disease on no medication status post resection, complains of 2 days intermittent dizziness described as a sense of her head shaking with double vision, states there is always some mild symptom there but then has episodes where it is worse lasting 2 to 3 minutes at a time, had some nausea but no vomiting, has bilateral tinnitus which is unchanged for "a long time," after episodes has frontal headache, mild, no difficulty speaking or swallowing, no neck pain, no chest pain or shortness of breath no palpitations, no focal numbness or weakness, was feeling better already as she came to the emergency room

## 2024-09-13 NOTE — ED PROVIDER NOTE - NSFOLLOWUPINSTRUCTIONS_ED_ALL_ED_FT
Vertigo  The outer and inner ear showing the eardrum and ear canal.  Vertigo is the feeling that you or the things around you are moving or spinning when they're not. It's different than feeling dizzy. It can also cause:  Loss of balance.  Trouble standing or walking.  Nausea and vomiting.  This feeling can come and go at any time. It can last from a few seconds to minutes or even hours. It may go away on its own or be treated with medicine.    What are the types of vertigo?  There are two types of vertigo:  Peripheral vertigo happens when parts of your inner ear don't work like they should. This is the more common type.  Central vertigo happens when your brain and spinal cord don't work like they should.  Your health care provider will do tests to find out what kind of vertigo you have. This will help them decide on the right treatment for you.    Follow these instructions at home:  Eating and drinking    Drink enough fluid to keep your pee (urine) pale yellow.  Do not drink alcohol.  Activity    When you get up in the morning, first sit up on the side of the bed. When you feel okay, stand slowly while holding onto something.  Move slowly. Avoid sudden body or head movements.  Avoid certain positions, as told by your provider.  Use a cane if you have trouble standing or walking.  Sit down right away if you feel unsteady.  Place items in your home so they're easy for you to reach without bending or leaning over.  Return to normal activities when you're told. Ask what things are safe for you to do.  General instructions    Take your medicines only as told by your provider.  Contact a health care provider if:  Your medicines don't help or make your vertigo worse.  You get new symptoms.  You have a fever.  You have nausea or vomiting.  Your family or friends spot any changes in how you're acting.  A part of your body goes numb.  You feel tingling and prickling in a part of your body.  You get very bad headaches.  Get help right away if:  You're always dizzy or you faint.  You have a stiff neck.  You have trouble moving or speaking.  Your hands, arms, or legs feel weak.  Your hearing or eyesight changes.  These symptoms may be an emergency. Call 911 right away.  Do not wait to see if the symptoms will go away.  Do not drive yourself to the hospital.  This information is not intended to replace advice given to you by your health care provider. Make sure you discuss any questions you have with your health care provider.    Document Revised: 03/22/2024 Document Reviewed: 03/22/2024  Elsevier Patient Education © 2024 [employer]

## 2025-02-19 ENCOUNTER — EMERGENCY (EMERGENCY)
Facility: HOSPITAL | Age: 70
LOS: 0 days | Discharge: LEFT BEFORE DISCHARGE | End: 2025-02-19
Attending: EMERGENCY MEDICINE
Payer: MEDICARE

## 2025-02-19 VITALS
HEART RATE: 80 BPM | RESPIRATION RATE: 16 BRPM | DIASTOLIC BLOOD PRESSURE: 79 MMHG | TEMPERATURE: 99 F | OXYGEN SATURATION: 93 % | SYSTOLIC BLOOD PRESSURE: 133 MMHG

## 2025-02-19 VITALS
DIASTOLIC BLOOD PRESSURE: 95 MMHG | RESPIRATION RATE: 19 BRPM | TEMPERATURE: 98 F | OXYGEN SATURATION: 96 % | HEART RATE: 82 BPM | WEIGHT: 149.91 LBS | SYSTOLIC BLOOD PRESSURE: 142 MMHG

## 2025-02-19 DIAGNOSIS — I10 ESSENTIAL (PRIMARY) HYPERTENSION: ICD-10-CM

## 2025-02-19 DIAGNOSIS — Z88.5 ALLERGY STATUS TO NARCOTIC AGENT: ICD-10-CM

## 2025-02-19 DIAGNOSIS — K50.90 CROHN'S DISEASE, UNSPECIFIED, WITHOUT COMPLICATIONS: ICD-10-CM

## 2025-02-19 DIAGNOSIS — K64.4 RESIDUAL HEMORRHOIDAL SKIN TAGS: ICD-10-CM

## 2025-02-19 DIAGNOSIS — Z53.29 PROCEDURE AND TREATMENT NOT CARRIED OUT BECAUSE OF PATIENT'S DECISION FOR OTHER REASONS: ICD-10-CM

## 2025-02-19 DIAGNOSIS — Z90.49 ACQUIRED ABSENCE OF OTHER SPECIFIED PARTS OF DIGESTIVE TRACT: Chronic | ICD-10-CM

## 2025-02-19 LAB
ALBUMIN SERPL ELPH-MCNC: 4.1 G/DL — SIGNIFICANT CHANGE UP (ref 3.5–5.2)
ALP SERPL-CCNC: 94 U/L — SIGNIFICANT CHANGE UP (ref 30–115)
ALT FLD-CCNC: 11 U/L — SIGNIFICANT CHANGE UP (ref 0–41)
ANION GAP SERPL CALC-SCNC: 10 MMOL/L — SIGNIFICANT CHANGE UP (ref 7–14)
APPEARANCE UR: CLEAR — SIGNIFICANT CHANGE UP
AST SERPL-CCNC: 16 U/L — SIGNIFICANT CHANGE UP (ref 0–41)
BASOPHILS # BLD AUTO: 0.05 K/UL — SIGNIFICANT CHANGE UP (ref 0–0.2)
BASOPHILS NFR BLD AUTO: 0.5 % — SIGNIFICANT CHANGE UP (ref 0–1)
BILIRUB DIRECT SERPL-MCNC: 0.2 MG/DL — SIGNIFICANT CHANGE UP (ref 0–0.3)
BILIRUB INDIRECT FLD-MCNC: 0.4 MG/DL — SIGNIFICANT CHANGE UP (ref 0.2–1.2)
BILIRUB SERPL-MCNC: 0.6 MG/DL — SIGNIFICANT CHANGE UP (ref 0.2–1.2)
BILIRUB UR-MCNC: NEGATIVE — SIGNIFICANT CHANGE UP
BUN SERPL-MCNC: 9 MG/DL — LOW (ref 10–20)
CALCIUM SERPL-MCNC: 9.5 MG/DL — SIGNIFICANT CHANGE UP (ref 8.4–10.5)
CHLORIDE SERPL-SCNC: 102 MMOL/L — SIGNIFICANT CHANGE UP (ref 98–110)
CO2 SERPL-SCNC: 26 MMOL/L — SIGNIFICANT CHANGE UP (ref 17–32)
COLOR SPEC: YELLOW — SIGNIFICANT CHANGE UP
CREAT SERPL-MCNC: 0.9 MG/DL — SIGNIFICANT CHANGE UP (ref 0.7–1.5)
DIFF PNL FLD: NEGATIVE — SIGNIFICANT CHANGE UP
EGFR: 70 ML/MIN/1.73M2 — SIGNIFICANT CHANGE UP
EOSINOPHIL # BLD AUTO: 0.1 K/UL — SIGNIFICANT CHANGE UP (ref 0–0.7)
EOSINOPHIL NFR BLD AUTO: 0.9 % — SIGNIFICANT CHANGE UP (ref 0–8)
GLUCOSE SERPL-MCNC: 100 MG/DL — HIGH (ref 70–99)
GLUCOSE UR QL: NEGATIVE MG/DL — SIGNIFICANT CHANGE UP
HCT VFR BLD CALC: 43.3 % — SIGNIFICANT CHANGE UP (ref 37–47)
HGB BLD-MCNC: 14.4 G/DL — SIGNIFICANT CHANGE UP (ref 12–16)
IMM GRANULOCYTES NFR BLD AUTO: 0.1 % — SIGNIFICANT CHANGE UP (ref 0.1–0.3)
KETONES UR-MCNC: NEGATIVE MG/DL — SIGNIFICANT CHANGE UP
LACTATE SERPL-SCNC: 1.1 MMOL/L — SIGNIFICANT CHANGE UP (ref 0.7–2)
LEUKOCYTE ESTERASE UR-ACNC: NEGATIVE — SIGNIFICANT CHANGE UP
LIDOCAIN IGE QN: 19 U/L — SIGNIFICANT CHANGE UP (ref 7–60)
LYMPHOCYTES # BLD AUTO: 1.44 K/UL — SIGNIFICANT CHANGE UP (ref 1.2–3.4)
LYMPHOCYTES # BLD AUTO: 13.6 % — LOW (ref 20.5–51.1)
MAGNESIUM SERPL-MCNC: 1.9 MG/DL — SIGNIFICANT CHANGE UP (ref 1.8–2.4)
MCHC RBC-ENTMCNC: 31.2 PG — HIGH (ref 27–31)
MCHC RBC-ENTMCNC: 33.3 G/DL — SIGNIFICANT CHANGE UP (ref 32–37)
MCV RBC AUTO: 93.7 FL — SIGNIFICANT CHANGE UP (ref 81–99)
MONOCYTES # BLD AUTO: 0.78 K/UL — HIGH (ref 0.1–0.6)
MONOCYTES NFR BLD AUTO: 7.4 % — SIGNIFICANT CHANGE UP (ref 1.7–9.3)
NEUTROPHILS # BLD AUTO: 8.19 K/UL — HIGH (ref 1.4–6.5)
NEUTROPHILS NFR BLD AUTO: 77.5 % — HIGH (ref 42.2–75.2)
NITRITE UR-MCNC: NEGATIVE — SIGNIFICANT CHANGE UP
NRBC BLD AUTO-RTO: 0 /100 WBCS — SIGNIFICANT CHANGE UP (ref 0–0)
PH UR: 6 — SIGNIFICANT CHANGE UP (ref 5–8)
PLATELET # BLD AUTO: 249 K/UL — SIGNIFICANT CHANGE UP (ref 130–400)
PMV BLD: 10.1 FL — SIGNIFICANT CHANGE UP (ref 7.4–10.4)
POTASSIUM SERPL-MCNC: 4.4 MMOL/L — SIGNIFICANT CHANGE UP (ref 3.5–5)
POTASSIUM SERPL-SCNC: 4.4 MMOL/L — SIGNIFICANT CHANGE UP (ref 3.5–5)
PROT SERPL-MCNC: 6.6 G/DL — SIGNIFICANT CHANGE UP (ref 6–8)
PROT UR-MCNC: NEGATIVE MG/DL — SIGNIFICANT CHANGE UP
RBC # BLD: 4.62 M/UL — SIGNIFICANT CHANGE UP (ref 4.2–5.4)
RBC # FLD: 12.1 % — SIGNIFICANT CHANGE UP (ref 11.5–14.5)
SODIUM SERPL-SCNC: 138 MMOL/L — SIGNIFICANT CHANGE UP (ref 135–146)
SP GR SPEC: 1.01 — SIGNIFICANT CHANGE UP (ref 1–1.03)
UROBILINOGEN FLD QL: 0.2 MG/DL — SIGNIFICANT CHANGE UP (ref 0.2–1)
WBC # BLD: 10.57 K/UL — SIGNIFICANT CHANGE UP (ref 4.8–10.8)
WBC # FLD AUTO: 10.57 K/UL — SIGNIFICANT CHANGE UP (ref 4.8–10.8)

## 2025-02-19 PROCEDURE — 99285 EMERGENCY DEPT VISIT HI MDM: CPT

## 2025-02-19 PROCEDURE — 81003 URINALYSIS AUTO W/O SCOPE: CPT

## 2025-02-19 PROCEDURE — 85025 COMPLETE CBC W/AUTO DIFF WBC: CPT

## 2025-02-19 PROCEDURE — 99284 EMERGENCY DEPT VISIT MOD MDM: CPT | Mod: 25

## 2025-02-19 PROCEDURE — 83605 ASSAY OF LACTIC ACID: CPT

## 2025-02-19 PROCEDURE — 83690 ASSAY OF LIPASE: CPT

## 2025-02-19 PROCEDURE — 96374 THER/PROPH/DIAG INJ IV PUSH: CPT | Mod: XU

## 2025-02-19 PROCEDURE — 80048 BASIC METABOLIC PNL TOTAL CA: CPT

## 2025-02-19 PROCEDURE — 74177 CT ABD & PELVIS W/CONTRAST: CPT | Mod: 26

## 2025-02-19 PROCEDURE — 80076 HEPATIC FUNCTION PANEL: CPT

## 2025-02-19 PROCEDURE — 36415 COLL VENOUS BLD VENIPUNCTURE: CPT

## 2025-02-19 PROCEDURE — 96375 TX/PRO/DX INJ NEW DRUG ADDON: CPT

## 2025-02-19 PROCEDURE — 83735 ASSAY OF MAGNESIUM: CPT

## 2025-02-19 PROCEDURE — 74177 CT ABD & PELVIS W/CONTRAST: CPT | Mod: MC

## 2025-02-19 RX ORDER — IOHEXOL 350 MG/ML
30 INJECTION, SOLUTION INTRAVENOUS ONCE
Refills: 0 | Status: COMPLETED | OUTPATIENT
Start: 2025-02-19 | End: 2025-02-19

## 2025-02-19 RX ORDER — PREDNISONE 5 MG/1
3 TABLET ORAL
Qty: 21 | Refills: 0
Start: 2025-02-19 | End: 2025-02-25

## 2025-02-19 RX ORDER — LIDOCAINE HYDROCHLORIDE 10 MG/ML
3 INJECTION EPIDURAL; INFILTRATION; INTRACAUDAL ONCE
Refills: 0 | Status: COMPLETED | OUTPATIENT
Start: 2025-02-19 | End: 2025-02-19

## 2025-02-19 RX ORDER — BACTERIOSTATIC SODIUM CHLORIDE 0.9 %
1000 VIAL (ML) INJECTION ONCE
Refills: 0 | Status: COMPLETED | OUTPATIENT
Start: 2025-02-19 | End: 2025-02-19

## 2025-02-19 RX ORDER — ONDANSETRON 4 MG/1
4 TABLET, ORALLY DISINTEGRATING ORAL ONCE
Refills: 0 | Status: COMPLETED | OUTPATIENT
Start: 2025-02-19 | End: 2025-02-19

## 2025-02-19 RX ORDER — CIPROFLOXACIN HCL 500 MG
1 TABLET ORAL
Qty: 20 | Refills: 0
Start: 2025-02-19 | End: 2025-02-28

## 2025-02-19 RX ORDER — PREDNISONE 5 MG/1
2 TABLET ORAL
Qty: 14 | Refills: 0
Start: 2025-02-19 | End: 2025-02-25

## 2025-02-19 RX ORDER — HYDROMORPHONE HYDROCHLORIDE 4 MG/ML
0.5 INJECTION, SOLUTION INTRAMUSCULAR; INTRAVENOUS; SUBCUTANEOUS ONCE
Refills: 0 | Status: DISCONTINUED | OUTPATIENT
Start: 2025-02-19 | End: 2025-02-19

## 2025-02-19 RX ORDER — METRONIDAZOLE 500 MG
1 TABLET ORAL
Qty: 30 | Refills: 0
Start: 2025-02-19 | End: 2025-02-28

## 2025-02-19 RX ADMIN — IOHEXOL 30 MILLILITER(S): 350 INJECTION, SOLUTION INTRAVENOUS at 13:41

## 2025-02-19 RX ADMIN — LIDOCAINE HYDROCHLORIDE 3 MILLILITER(S): 10 INJECTION EPIDURAL; INFILTRATION; INTRACAUDAL at 12:58

## 2025-02-19 RX ADMIN — HYDROMORPHONE HYDROCHLORIDE 0.5 MILLIGRAM(S): 4 INJECTION, SOLUTION INTRAMUSCULAR; INTRAVENOUS; SUBCUTANEOUS at 18:48

## 2025-02-19 RX ADMIN — ONDANSETRON 4 MILLIGRAM(S): 4 TABLET, ORALLY DISINTEGRATING ORAL at 12:58

## 2025-02-19 RX ADMIN — HYDROMORPHONE HYDROCHLORIDE 0.5 MILLIGRAM(S): 4 INJECTION, SOLUTION INTRAMUSCULAR; INTRAVENOUS; SUBCUTANEOUS at 16:37

## 2025-02-19 RX ADMIN — Medication 1000 MILLILITER(S): at 12:58

## 2025-02-19 RX ADMIN — LIDOCAINE HYDROCHLORIDE 3 MILLILITER(S): 10 INJECTION EPIDURAL; INFILTRATION; INTRACAUDAL at 19:05

## 2025-02-19 NOTE — ED PROVIDER NOTE - OBJECTIVE STATEMENT
69-year-old female with past medical history of Crohn's (not on meds), SBO, hemicolectomy, and HTN presents to the ED complaining of persistent abdominal pain typical of her Crohn's flares over the last few weeks with worsening of pain over the last 2 days.  Pain is sharp, intermittent is diffuse across her abdomen and radiates into her rectal region.  She denies other complaints. Pt denies fever, chills, vomiting, diarrhea, headache, dizziness, weakness, chest pain, SOB, back pain, LOC, trauma, urinary symptoms, cough, calf pain/swelling, recent travel, recent surgery.

## 2025-02-19 NOTE — ED PROVIDER NOTE - CLINICAL SUMMARY MEDICAL DECISION MAKING FREE TEXT BOX
Patient with history of Crohn's on no medications with primarily rectal pain, exam as above, labs and studies appreciated, CT findings impressive and patient still in pain however refuses admission, discussed with GI who recommends antibiotics and course of prednisone, patient understands risks of leaving against advice including perforation and death, demonstrates capacity, will sign out against advice with prescriptions to follow-up with GI, encouraged to return at any time

## 2025-02-19 NOTE — ED PROVIDER NOTE - CARE PROVIDER_API CALL
Batsheva Rodriguez  Gastroenterology  [address], [address]  [place] [address]  Phone: [phone]  Fax: [phone]  Follow Up Time: 1-3 Days

## 2025-02-19 NOTE — ED PROVIDER NOTE - PROGRESS NOTE DETAILS
Given duration of patient's symptoms and CT findings recommended admission, patient does not want to be admitted at this time, understands risks of leaving, return precautions discussed at bedside, will sign out AMA.    Discussed case with patient's GI team, recommending initiating 60 Mg prednisone daily for 1 week, followed by 40 Mg prednisone second week as well as Cipro/Flagyl.  State will see patient in office this week.

## 2025-02-19 NOTE — ED ADULT NURSE NOTE - NSFALLUNIVINTERV_ED_ALL_ED
Bed/Stretcher in lowest position, wheels locked, appropriate side rails in place/Call bell, personal items and telephone in reach/Instruct patient to call for assistance before getting out of bed/chair/stretcher/Non-slip footwear applied when patient is off stretcher/[place] to call system/Physically safe environment - no spills, clutter or unnecessary equipment/Purposeful proactive rounding/Room/bathroom lighting operational, light cord in reach

## 2025-02-19 NOTE — ED PROVIDER NOTE - PATIENT PORTAL LINK FT
You can access the FollowMyHealth Patient Portal offered by [facility] by registering at the following website: [email or web address]. By joining [facility]’s FollowMyHealth portal, you will also be able to view your health information using other applications (apps) compatible with our system.

## 2025-02-19 NOTE — ED PROVIDER NOTE - NSFOLLOWUPINSTRUCTIONS_ED_ALL_ED_FT
Crohn disease is a disease where parts of the digestive tract become inflamed. It is a form of inflammatory bowel disease.After a flare-up of your Crohn disease, you may be more tired and have less energy than before. This should get better with time.    Certain foods and drinks can make your symptoms worse. These foods may cause problems for you all the time or only during a flare-up. Try to avoid foods that make your symptoms worse.If your body does not digest dairy foods well, limit dairy products. Try low-lactose cheeses, such as Swiss and cheddar, or an enzyme product, such as Lactaid, to help break down lactose. Too much fiber may make your symptoms worse. Try baking or stewing fruits and vegetables if eating them raw bothers you. Eat low-fiber foods if that does not help enough.Avoid foods that are known to cause gas, such as beans, spicy food, cabbage, broccoli, cauliflower, raw fruit juices, and fruits, especially citrus fruits.    Anti-diarrhea drugs can help when you have very bad diarrhea. Loperamide (Imodium) can be bought without a prescription. Always talk to your provider before using these drugs.  Fiber supplements may help your symptoms. You can buy psyllium powder (Metamucil) or methylcellulose (Citrucel) without a prescription. You may use acetaminophen (Tylenol) for mild pain. Avoid or limit alcohol and caffeine. They may make your diarrhea worse.  Call your provider if you have:  Cramps or pain in your lower stomach area  Bloody diarrhea, often with mucus or pus  Diarrhea that cannot be controlled with diet changes and drugs  Weight loss (in everyone) and failure to gain weight (in children)  Rectal bleeding, drainage, or sores  Fever that lasts more than 2 or 3 days, or a fever higher than 100.4°F (38°C) without an explanation  Nausea and vomiting that lasts more than a day  Skin sores or lesions that do not heal  Joint pain that keeps you from doing your everyday activities  Side effects from any drugs prescribed for your condition

## 2025-02-19 NOTE — ED PROVIDER NOTE - REFUSAL OF SERVICE
decreased endurance/impaired balance/narrow base of support/impaired postural control/decreased strength patient/guardian displays adequate decision making...

## 2025-02-19 NOTE — ED PROVIDER NOTE - PHYSICAL EXAMINATION
VITAL SIGNS: I have reviewed nursing notes and confirm.  CONSTITUTIONAL: 70 yo F laying on stretcher; in no acute distress.  SKIN: Skin exam is warm and dry, no acute rash.  HEAD: Normocephalic; atraumatic.  EYES: Conjunctiva and sclera clear.  ENT: No nasal discharge; airway clear.   CARD: S1, S2 normal; no murmurs, gallops, or rubs. Regular rate and rhythm.  RESP: No wheezes, rales or rhonchi. Speaking in full sentences.   ABD: Normal bowel sounds; soft; non-distended; (+) mild diffuse TTP; No rebound or guarding. No CVA tenderness.  RECTAL: (+) non-thrombosed external hemorrhoids, brown stool, small amount of BRB. Chaperoned by ANNE Alatorre.  EXT: Normal ROM. No clubbing, cyanosis or edema.  NEURO: Alert, oriented. Grossly unremarkable. No focal deficits.

## 2025-02-26 DIAGNOSIS — Z12.39 ENCOUNTER FOR OTHER SCREENING FOR MALIGNANT NEOPLASM OF BREAST: ICD-10-CM

## 2025-03-05 ENCOUNTER — APPOINTMENT (OUTPATIENT)
Dept: OBGYN | Facility: CLINIC | Age: 70
End: 2025-03-05